# Patient Record
Sex: FEMALE | Race: BLACK OR AFRICAN AMERICAN | Employment: FULL TIME | URBAN - METROPOLITAN AREA
[De-identification: names, ages, dates, MRNs, and addresses within clinical notes are randomized per-mention and may not be internally consistent; named-entity substitution may affect disease eponyms.]

---

## 2022-07-21 ENCOUNTER — OFFICE VISIT (OUTPATIENT)
Dept: OBGYN CLINIC | Facility: CLINIC | Age: 42
End: 2022-07-21
Payer: COMMERCIAL

## 2022-07-21 VITALS
WEIGHT: 170 LBS | DIASTOLIC BLOOD PRESSURE: 81 MMHG | HEART RATE: 73 BPM | SYSTOLIC BLOOD PRESSURE: 118 MMHG | BODY MASS INDEX: 28 KG/M2

## 2022-07-21 DIAGNOSIS — N89.8 VAGINAL DRYNESS: Primary | ICD-10-CM

## 2022-07-21 DIAGNOSIS — N76.0 VAGINITIS AND VULVOVAGINITIS: ICD-10-CM

## 2022-07-21 PROCEDURE — 3074F SYST BP LT 130 MM HG: CPT | Performed by: OBSTETRICS & GYNECOLOGY

## 2022-07-21 PROCEDURE — 3079F DIAST BP 80-89 MM HG: CPT | Performed by: OBSTETRICS & GYNECOLOGY

## 2022-07-21 PROCEDURE — 99203 OFFICE O/P NEW LOW 30 MIN: CPT | Performed by: OBSTETRICS & GYNECOLOGY

## 2022-07-21 RX ORDER — NAPROXEN 500 MG/1
500 TABLET ORAL 2 TIMES DAILY PRN
COMMUNITY
Start: 2022-06-30

## 2022-07-21 RX ORDER — FLUTICASONE FUROATE AND VILANTEROL 100; 25 UG/1; UG/1
1 POWDER RESPIRATORY (INHALATION) DAILY
COMMUNITY
Start: 2022-05-18

## 2022-07-21 RX ORDER — FLUTICASONE FUROATE AND VILANTEROL TRIFENATATE 100; 25 UG/1; UG/1
1 POWDER RESPIRATORY (INHALATION) DAILY
COMMUNITY
Start: 2022-05-18

## 2022-07-21 RX ORDER — MONTELUKAST SODIUM 10 MG/1
1 TABLET ORAL NIGHTLY
COMMUNITY
Start: 2022-06-17

## 2022-07-21 RX ORDER — FLUCONAZOLE 200 MG/1
200 TABLET ORAL
Qty: 3 TABLET | Refills: 1 | Status: SHIPPED | OUTPATIENT
Start: 2022-07-21 | End: 2022-07-28

## 2022-07-21 RX ORDER — ESTRADIOL 0.1 MG/G
1 CREAM VAGINAL
COMMUNITY
Start: 2022-01-25 | End: 2022-07-21

## 2022-07-21 RX ORDER — LINACLOTIDE 145 UG/1
1 CAPSULE, GELATIN COATED ORAL
COMMUNITY
Start: 2021-11-18

## 2022-07-21 RX ORDER — TRIAMCINOLONE ACETONIDE 1 MG/G
CREAM TOPICAL
COMMUNITY
Start: 2022-02-28

## 2022-07-21 RX ORDER — ESTRADIOL 0.1 MG/G
1 CREAM VAGINAL
Qty: 42.5 G | Refills: 3 | Status: SHIPPED | OUTPATIENT
Start: 2022-07-21 | End: 2022-07-21

## 2022-07-22 DIAGNOSIS — B37.9 YEAST INFECTION: ICD-10-CM

## 2022-07-22 DIAGNOSIS — N76.0 BACTERIAL VAGINITIS: Primary | ICD-10-CM

## 2022-07-22 DIAGNOSIS — B96.89 BACTERIAL VAGINITIS: Primary | ICD-10-CM

## 2022-07-22 RX ORDER — METRONIDAZOLE 500 MG/1
500 TABLET ORAL 2 TIMES DAILY
Qty: 14 TABLET | Refills: 0 | Status: SHIPPED | OUTPATIENT
Start: 2022-07-22

## 2022-10-24 ENCOUNTER — OFFICE VISIT (OUTPATIENT)
Dept: OBGYN CLINIC | Facility: CLINIC | Age: 42
End: 2022-10-24
Payer: COMMERCIAL

## 2022-10-24 VITALS — BODY MASS INDEX: 28 KG/M2 | WEIGHT: 170.81 LBS

## 2022-10-24 DIAGNOSIS — N76.0 BACTERIAL VAGINOSIS: ICD-10-CM

## 2022-10-24 DIAGNOSIS — N89.8 VAGINAL DRYNESS: ICD-10-CM

## 2022-10-24 DIAGNOSIS — N89.8 VAGINAL ODOR: Primary | ICD-10-CM

## 2022-10-24 DIAGNOSIS — N89.8 VAGINAL IRRITATION: ICD-10-CM

## 2022-10-24 DIAGNOSIS — N89.8 VAGINAL DISCHARGE: ICD-10-CM

## 2022-10-24 DIAGNOSIS — B96.89 BACTERIAL VAGINOSIS: ICD-10-CM

## 2022-10-24 PROCEDURE — 99213 OFFICE O/P EST LOW 20 MIN: CPT | Performed by: NURSE PRACTITIONER

## 2022-10-24 PROCEDURE — 87220 TISSUE EXAM FOR FUNGI: CPT | Performed by: NURSE PRACTITIONER

## 2022-10-24 RX ORDER — TACROLIMUS 1 MG/G
OINTMENT TOPICAL
COMMUNITY
Start: 2022-10-07

## 2022-10-24 RX ORDER — ESTRADIOL 0.1 MG/G
CREAM VAGINAL
COMMUNITY
Start: 2022-07-21

## 2022-10-24 RX ORDER — METRONIDAZOLE 7.5 MG/G
1 GEL VAGINAL NIGHTLY
Qty: 1 EACH | Refills: 0 | Status: SHIPPED | OUTPATIENT
Start: 2022-10-24 | End: 2022-10-29

## 2022-10-25 LAB
C TRACH DNA SPEC QL NAA+PROBE: NEGATIVE
N GONORRHOEA DNA SPEC QL NAA+PROBE: NEGATIVE

## 2022-10-26 DIAGNOSIS — B37.31 VAGINAL CANDIDIASIS: Primary | ICD-10-CM

## 2022-10-26 LAB
GENITAL VAGINOSIS SCREEN: POSITIVE
TRICHOMONAS SCREEN: NEGATIVE

## 2022-10-26 RX ORDER — FLUCONAZOLE 150 MG/1
150 TABLET ORAL ONCE
Qty: 2 TABLET | Refills: 0 | Status: SHIPPED | OUTPATIENT
Start: 2022-10-26 | End: 2022-10-26

## 2022-11-10 ENCOUNTER — OFFICE VISIT (OUTPATIENT)
Dept: OBGYN CLINIC | Facility: CLINIC | Age: 42
End: 2022-11-10
Payer: COMMERCIAL

## 2022-11-10 VITALS — SYSTOLIC BLOOD PRESSURE: 128 MMHG | WEIGHT: 170 LBS | DIASTOLIC BLOOD PRESSURE: 86 MMHG | BODY MASS INDEX: 28 KG/M2

## 2022-11-10 DIAGNOSIS — N76.0 VAGINITIS AND VULVOVAGINITIS: Primary | ICD-10-CM

## 2022-11-10 PROCEDURE — 99213 OFFICE O/P EST LOW 20 MIN: CPT | Performed by: OBSTETRICS & GYNECOLOGY

## 2022-11-10 PROCEDURE — 3079F DIAST BP 80-89 MM HG: CPT | Performed by: OBSTETRICS & GYNECOLOGY

## 2022-11-10 PROCEDURE — 3074F SYST BP LT 130 MM HG: CPT | Performed by: OBSTETRICS & GYNECOLOGY

## 2022-11-10 RX ORDER — PIMECROLIMUS 10 MG/G
1 CREAM TOPICAL 2 TIMES DAILY
COMMUNITY
Start: 2022-10-31

## 2022-11-10 RX ORDER — CYCLOBENZAPRINE HCL 10 MG
10 TABLET ORAL 3 TIMES DAILY
COMMUNITY
Start: 2022-11-03

## 2022-11-10 RX ORDER — ALCLOMETASONE DIPROPIONATE 0.5 MG/G
OINTMENT TOPICAL
COMMUNITY
Start: 2022-10-24

## 2022-11-12 RX ORDER — METRONIDAZOLE 10 MG/G
1 GEL TOPICAL DAILY
Qty: 22 EACH | Refills: 1 | Status: SHIPPED | OUTPATIENT
Start: 2022-11-12

## 2022-11-15 ENCOUNTER — TELEPHONE (OUTPATIENT)
Dept: OBGYN CLINIC | Facility: CLINIC | Age: 42
End: 2022-11-15

## 2022-11-15 NOTE — TELEPHONE ENCOUNTER
metroNIDAZOLE (METROGEL) 1 % External Gel, Apply 1 Application topically daily. Vaginally at bedtime x 7 nights then weekly for 15 weeks, Disp: 22 each, Rfl: 1    MESSAGE: please clarify is this for topical metrogel 1% or vaginal metrogel 0.75%? Please send new prescription and update directions.

## 2022-11-15 NOTE — TELEPHONE ENCOUNTER
Pt to apply vaginally. This RN informed pharmacy of above and they will update pt's script. No further questions.

## 2023-01-15 ENCOUNTER — HOSPITAL ENCOUNTER (EMERGENCY)
Facility: HOSPITAL | Age: 43
Discharge: HOME | End: 2023-01-15
Attending: EMERGENCY MEDICINE
Payer: COMMERCIAL

## 2023-01-15 VITALS
OXYGEN SATURATION: 93 % | WEIGHT: 169 LBS | RESPIRATION RATE: 16 BRPM | HEIGHT: 63 IN | BODY MASS INDEX: 29.95 KG/M2 | TEMPERATURE: 98 F | SYSTOLIC BLOOD PRESSURE: 119 MMHG | HEART RATE: 74 BPM | DIASTOLIC BLOOD PRESSURE: 83 MMHG

## 2023-01-15 DIAGNOSIS — B37.9 YEAST INFECTION: ICD-10-CM

## 2023-01-15 DIAGNOSIS — N76.0 BACTERIAL VAGINOSIS: Primary | ICD-10-CM

## 2023-01-15 DIAGNOSIS — B96.89 BACTERIAL VAGINOSIS: Primary | ICD-10-CM

## 2023-01-15 LAB
B-HCG UR QL: NEGATIVE
BILIRUB UR QL STRIP.AUTO: NEGATIVE
CLARITY UR REFRACT.AUTO: CLEAR
COLOR UR AUTO: YELLOW
GLUCOSE UR STRIP.AUTO-MCNC: NEGATIVE MG/DL
KETONES UR STRIP.AUTO-MCNC: NEGATIVE MG/DL
NITRITE UR QL STRIP.AUTO: NEGATIVE
PH UR STRIP.AUTO: 7 [PH] (ref 5–8)
PROT UR STRIP.AUTO-MCNC: NEGATIVE MG/DL
RBC UR QL AUTO: NEGATIVE
SP GR UR STRIP.AUTO: 1.02 (ref 1–1.03)
UROBILINOGEN UR STRIP.AUTO-MCNC: 0.2 MG/DL

## 2023-01-15 PROCEDURE — 87086 URINE CULTURE/COLONY COUNT: CPT | Performed by: EMERGENCY MEDICINE

## 2023-01-15 PROCEDURE — 81015 MICROSCOPIC EXAM OF URINE: CPT | Performed by: EMERGENCY MEDICINE

## 2023-01-15 PROCEDURE — 87660 TRICHOMONAS VAGIN DIR PROBE: CPT | Performed by: EMERGENCY MEDICINE

## 2023-01-15 PROCEDURE — 99284 EMERGENCY DEPT VISIT MOD MDM: CPT

## 2023-01-15 PROCEDURE — 87510 GARDNER VAG DNA DIR PROBE: CPT | Performed by: EMERGENCY MEDICINE

## 2023-01-15 PROCEDURE — 81025 URINE PREGNANCY TEST: CPT

## 2023-01-15 PROCEDURE — 87491 CHLMYD TRACH DNA AMP PROBE: CPT | Performed by: EMERGENCY MEDICINE

## 2023-01-15 PROCEDURE — 87591 N.GONORRHOEAE DNA AMP PROB: CPT | Performed by: EMERGENCY MEDICINE

## 2023-01-15 PROCEDURE — 81001 URINALYSIS AUTO W/SCOPE: CPT | Performed by: EMERGENCY MEDICINE

## 2023-01-15 PROCEDURE — 87480 CANDIDA DNA DIR PROBE: CPT | Performed by: EMERGENCY MEDICINE

## 2023-01-15 RX ORDER — METRONIDAZOLE 500 MG/1
500 TABLET ORAL 3 TIMES DAILY
Qty: 30 TABLET | Refills: 0 | Status: SHIPPED | OUTPATIENT
Start: 2023-01-15 | End: 2023-01-25

## 2023-01-15 RX ORDER — FLUCONAZOLE 200 MG/1
200 TABLET ORAL DAILY
Qty: 2 TABLET | Refills: 0 | Status: SHIPPED | OUTPATIENT
Start: 2023-01-15 | End: 2023-01-17

## 2023-01-15 NOTE — ED INITIAL ASSESSMENT (HPI)
Pt c/o \"vaginal irritation\" x 1 week. Pt states recurrent bacterial infections lately. Pt states today having yellow clumpy discharge. Pt also c/o mild cramping.

## 2023-01-16 LAB
C TRACH DNA SPEC QL NAA+PROBE: NEGATIVE
N GONORRHOEA DNA SPEC QL NAA+PROBE: NEGATIVE

## 2023-03-16 ENCOUNTER — LAB ENCOUNTER (OUTPATIENT)
Dept: LAB | Age: 43
End: 2023-03-16
Attending: OBSTETRICS & GYNECOLOGY
Payer: COMMERCIAL

## 2023-03-16 ENCOUNTER — OFFICE VISIT (OUTPATIENT)
Dept: OBGYN CLINIC | Facility: CLINIC | Age: 43
End: 2023-03-16

## 2023-03-16 VITALS
WEIGHT: 170 LBS | DIASTOLIC BLOOD PRESSURE: 81 MMHG | BODY MASS INDEX: 30.12 KG/M2 | SYSTOLIC BLOOD PRESSURE: 127 MMHG | HEIGHT: 63 IN

## 2023-03-16 DIAGNOSIS — Z12.31 ENCOUNTER FOR SCREENING MAMMOGRAM FOR BREAST CANCER: ICD-10-CM

## 2023-03-16 DIAGNOSIS — Z13.220 SCREENING, LIPID: ICD-10-CM

## 2023-03-16 DIAGNOSIS — Z01.419 ENCOUNTER FOR WELL WOMAN EXAM WITH ROUTINE GYNECOLOGICAL EXAM: Primary | ICD-10-CM

## 2023-03-16 DIAGNOSIS — Z13.29 THYROID DISORDER SCREENING: ICD-10-CM

## 2023-03-16 DIAGNOSIS — N76.0 ACUTE VAGINITIS: ICD-10-CM

## 2023-03-16 DIAGNOSIS — Z13.1 SCREENING FOR DIABETES MELLITUS (DM): ICD-10-CM

## 2023-03-16 LAB
CHOLEST SERPL-MCNC: 169 MG/DL (ref ?–200)
FASTING PATIENT GLUCOSE ANSWER: NO
FASTING PATIENT LIPID ANSWER: NO
GLUCOSE BLD-MCNC: 83 MG/DL (ref 70–99)
HDLC SERPL-MCNC: 60 MG/DL (ref 40–59)
LDLC SERPL CALC-MCNC: 95 MG/DL (ref ?–100)
NONHDLC SERPL-MCNC: 109 MG/DL (ref ?–130)
TRIGL SERPL-MCNC: 76 MG/DL (ref 30–149)
TSI SER-ACNC: 2.74 MIU/ML (ref 0.36–3.74)
VLDLC SERPL CALC-MCNC: 12 MG/DL (ref 0–30)

## 2023-03-16 PROCEDURE — 3079F DIAST BP 80-89 MM HG: CPT | Performed by: OBSTETRICS & GYNECOLOGY

## 2023-03-16 PROCEDURE — 80061 LIPID PANEL: CPT

## 2023-03-16 PROCEDURE — 99396 PREV VISIT EST AGE 40-64: CPT | Performed by: OBSTETRICS & GYNECOLOGY

## 2023-03-16 PROCEDURE — 84443 ASSAY THYROID STIM HORMONE: CPT

## 2023-03-16 PROCEDURE — 3074F SYST BP LT 130 MM HG: CPT | Performed by: OBSTETRICS & GYNECOLOGY

## 2023-03-16 PROCEDURE — 82947 ASSAY GLUCOSE BLOOD QUANT: CPT

## 2023-03-16 PROCEDURE — 3008F BODY MASS INDEX DOCD: CPT | Performed by: OBSTETRICS & GYNECOLOGY

## 2023-03-17 ENCOUNTER — TELEPHONE (OUTPATIENT)
Dept: OBGYN CLINIC | Facility: CLINIC | Age: 43
End: 2023-03-17

## 2023-03-17 NOTE — TELEPHONE ENCOUNTER
Pt has test results and asking if a prescription will be sent to pharmacy.     Confirmed pharmacy  Pls advise

## 2023-03-18 RX ORDER — FLUCONAZOLE 200 MG/1
200 TABLET ORAL
Qty: 3 TABLET | Refills: 1 | Status: SHIPPED | OUTPATIENT
Start: 2023-03-18 | End: 2023-03-25

## 2023-03-18 RX ORDER — METRONIDAZOLE 7.5 MG/G
1 GEL VAGINAL NIGHTLY
Qty: 20 EACH | Refills: 1 | Status: SHIPPED | OUTPATIENT
Start: 2023-03-18 | End: 2023-03-23

## 2023-03-20 NOTE — TELEPHONE ENCOUNTER
Meg - you have yeast and BV      Need to start probiotics for the vagina - see [facility] for options or [facility]otics via [address] and meds called into your pharmacy. Take as directed.       Peng Julio MD   Written by Peng Julio MD on 3/18/2023 10:51 AM CDT  Seen by patient Kristin Case 238 on 3/18/2023 11:37 AM

## 2023-03-24 ENCOUNTER — HOSPITAL ENCOUNTER (OUTPATIENT)
Dept: MAMMOGRAPHY | Age: 43
Discharge: HOME | End: 2023-03-24
Attending: OBSTETRICS & GYNECOLOGY
Payer: COMMERCIAL

## 2023-03-24 DIAGNOSIS — Z12.31 ENCOUNTER FOR SCREENING MAMMOGRAM FOR BREAST CANCER: ICD-10-CM

## 2023-03-24 PROCEDURE — 77067 SCR MAMMO BI INCL CAD: CPT | Performed by: OBSTETRICS & GYNECOLOGY

## 2023-03-24 PROCEDURE — 77063 BREAST TOMOSYNTHESIS BI: CPT | Performed by: OBSTETRICS & GYNECOLOGY

## 2023-06-07 ENCOUNTER — HOSPITAL ENCOUNTER (EMERGENCY)
Facility: HOSPITAL | Age: 43
Discharge: HOME | End: 2023-06-08
Attending: EMERGENCY MEDICINE
Payer: COMMERCIAL

## 2023-06-07 DIAGNOSIS — N83.201 CYST OF RIGHT OVARY: Primary | ICD-10-CM

## 2023-06-07 LAB
B-HCG UR QL: NEGATIVE
BILIRUB UR QL STRIP.AUTO: NEGATIVE
COLOR UR AUTO: YELLOW
GLUCOSE UR STRIP.AUTO-MCNC: NEGATIVE MG/DL
KETONES UR STRIP.AUTO-MCNC: NEGATIVE MG/DL
NITRITE UR QL STRIP.AUTO: POSITIVE
PH UR STRIP.AUTO: 5 [PH] (ref 5–8)
PROT UR STRIP.AUTO-MCNC: NEGATIVE MG/DL
SP GR UR STRIP.AUTO: 1.01 (ref 1–1.03)
UROBILINOGEN UR STRIP.AUTO-MCNC: <2 MG/DL

## 2023-06-07 PROCEDURE — 99284 EMERGENCY DEPT VISIT MOD MDM: CPT

## 2023-06-07 PROCEDURE — 87077 CULTURE AEROBIC IDENTIFY: CPT | Performed by: EMERGENCY MEDICINE

## 2023-06-07 PROCEDURE — 81001 URINALYSIS AUTO W/SCOPE: CPT | Performed by: EMERGENCY MEDICINE

## 2023-06-07 PROCEDURE — 87086 URINE CULTURE/COLONY COUNT: CPT | Performed by: EMERGENCY MEDICINE

## 2023-06-07 PROCEDURE — 99285 EMERGENCY DEPT VISIT HI MDM: CPT

## 2023-06-07 PROCEDURE — 81001 URINALYSIS AUTO W/SCOPE: CPT

## 2023-06-07 PROCEDURE — 81025 URINE PREGNANCY TEST: CPT

## 2023-06-07 RX ORDER — METHYLPREDNISOLONE SODIUM SUCCINATE 125 MG/2ML
125 INJECTION, POWDER, LYOPHILIZED, FOR SOLUTION INTRAMUSCULAR; INTRAVENOUS ONCE
Status: COMPLETED | OUTPATIENT
Start: 2023-06-07 | End: 2023-06-08

## 2023-06-07 RX ORDER — DIPHENHYDRAMINE HYDROCHLORIDE 50 MG/ML
25 INJECTION INTRAMUSCULAR; INTRAVENOUS ONCE
Status: COMPLETED | OUTPATIENT
Start: 2023-06-07 | End: 2023-06-08

## 2023-06-08 ENCOUNTER — APPOINTMENT (OUTPATIENT)
Dept: CT IMAGING | Facility: HOSPITAL | Age: 43
End: 2023-06-08
Attending: EMERGENCY MEDICINE
Payer: COMMERCIAL

## 2023-06-08 VITALS
SYSTOLIC BLOOD PRESSURE: 102 MMHG | WEIGHT: 170 LBS | TEMPERATURE: 98 F | RESPIRATION RATE: 15 BRPM | HEIGHT: 63 IN | HEART RATE: 68 BPM | OXYGEN SATURATION: 98 % | BODY MASS INDEX: 30.12 KG/M2 | DIASTOLIC BLOOD PRESSURE: 56 MMHG

## 2023-06-08 LAB
ALBUMIN SERPL-MCNC: 3.5 G/DL (ref 3.4–5)
ALBUMIN/GLOB SERPL: 1 {RATIO} (ref 1–2)
ALP LIVER SERPL-CCNC: 49 U/L
ALT SERPL-CCNC: 11 U/L
ANION GAP SERPL CALC-SCNC: 6 MMOL/L (ref 0–18)
AST SERPL-CCNC: 12 U/L (ref 15–37)
BASOPHILS # BLD AUTO: 0.03 X10(3) UL (ref 0–0.2)
BASOPHILS NFR BLD AUTO: 0.3 %
BILIRUB SERPL-MCNC: 0.4 MG/DL (ref 0.1–2)
BUN BLD-MCNC: 13 MG/DL (ref 7–18)
CALCIUM BLD-MCNC: 9.2 MG/DL (ref 8.5–10.1)
CHLORIDE SERPL-SCNC: 107 MMOL/L (ref 98–112)
CO2 SERPL-SCNC: 25 MMOL/L (ref 21–32)
CREAT BLD-MCNC: 0.62 MG/DL
EOSINOPHIL # BLD AUTO: 0.13 X10(3) UL (ref 0–0.7)
EOSINOPHIL NFR BLD AUTO: 1.5 %
ERYTHROCYTE [DISTWIDTH] IN BLOOD BY AUTOMATED COUNT: 12.9 %
GFR SERPLBLD BASED ON 1.73 SQ M-ARVRAT: 113 ML/MIN/1.73M2 (ref 60–?)
GLOBULIN PLAS-MCNC: 3.6 G/DL (ref 2.8–4.4)
GLUCOSE BLD-MCNC: 89 MG/DL (ref 70–99)
HCT VFR BLD AUTO: 33.3 %
HGB BLD-MCNC: 12.1 G/DL
IMM GRANULOCYTES # BLD AUTO: 0.02 X10(3) UL (ref 0–1)
IMM GRANULOCYTES NFR BLD: 0.2 %
LIPASE SERPL-CCNC: 66 U/L (ref 13–75)
LYMPHOCYTES # BLD AUTO: 3.33 X10(3) UL (ref 1–4)
LYMPHOCYTES NFR BLD AUTO: 37.6 %
MCH RBC QN AUTO: 29.6 PG (ref 26–34)
MCHC RBC AUTO-ENTMCNC: 36.3 G/DL (ref 31–37)
MCV RBC AUTO: 81.4 FL
MONOCYTES # BLD AUTO: 0.79 X10(3) UL (ref 0.1–1)
MONOCYTES NFR BLD AUTO: 8.9 %
NEUTROPHILS # BLD AUTO: 4.55 X10 (3) UL (ref 1.5–7.7)
NEUTROPHILS # BLD AUTO: 4.55 X10(3) UL (ref 1.5–7.7)
NEUTROPHILS NFR BLD AUTO: 51.5 %
OSMOLALITY SERPL CALC.SUM OF ELEC: 286 MOSM/KG (ref 275–295)
PLATELET # BLD AUTO: 221 10(3)UL (ref 150–450)
POTASSIUM SERPL-SCNC: 3.5 MMOL/L (ref 3.5–5.1)
PROT SERPL-MCNC: 7.1 G/DL (ref 6.4–8.2)
RBC # BLD AUTO: 4.09 X10(6)UL
SODIUM SERPL-SCNC: 138 MMOL/L (ref 136–145)
WBC # BLD AUTO: 8.9 X10(3) UL (ref 4–11)

## 2023-06-08 PROCEDURE — 87510 GARDNER VAG DNA DIR PROBE: CPT | Performed by: EMERGENCY MEDICINE

## 2023-06-08 PROCEDURE — 87491 CHLMYD TRACH DNA AMP PROBE: CPT | Performed by: EMERGENCY MEDICINE

## 2023-06-08 PROCEDURE — 96372 THER/PROPH/DIAG INJ SC/IM: CPT

## 2023-06-08 PROCEDURE — 96374 THER/PROPH/DIAG INJ IV PUSH: CPT

## 2023-06-08 PROCEDURE — 74177 CT ABD & PELVIS W/CONTRAST: CPT | Performed by: EMERGENCY MEDICINE

## 2023-06-08 PROCEDURE — 96375 TX/PRO/DX INJ NEW DRUG ADDON: CPT

## 2023-06-08 PROCEDURE — 87660 TRICHOMONAS VAGIN DIR PROBE: CPT | Performed by: EMERGENCY MEDICINE

## 2023-06-08 PROCEDURE — 85025 COMPLETE CBC W/AUTO DIFF WBC: CPT | Performed by: EMERGENCY MEDICINE

## 2023-06-08 PROCEDURE — 87480 CANDIDA DNA DIR PROBE: CPT | Performed by: EMERGENCY MEDICINE

## 2023-06-08 PROCEDURE — 87591 N.GONORRHOEAE DNA AMP PROB: CPT | Performed by: EMERGENCY MEDICINE

## 2023-06-08 PROCEDURE — 80053 COMPREHEN METABOLIC PANEL: CPT | Performed by: EMERGENCY MEDICINE

## 2023-06-08 PROCEDURE — 83690 ASSAY OF LIPASE: CPT | Performed by: EMERGENCY MEDICINE

## 2023-06-08 RX ORDER — METRONIDAZOLE 500 MG/1
2000 TABLET ORAL ONCE
Qty: 4 TABLET | Refills: 0 | Status: SHIPPED | OUTPATIENT
Start: 2023-06-08 | End: 2023-06-08

## 2023-06-08 RX ORDER — DOXYCYCLINE HYCLATE 100 MG/1
100 CAPSULE ORAL ONCE
Status: COMPLETED | OUTPATIENT
Start: 2023-06-08 | End: 2023-06-08

## 2023-06-08 RX ORDER — DOXYCYCLINE HYCLATE 100 MG/1
100 CAPSULE ORAL 2 TIMES DAILY
Qty: 14 CAPSULE | Refills: 0 | Status: SHIPPED | OUTPATIENT
Start: 2023-06-08 | End: 2023-06-15

## 2023-06-08 NOTE — ED INITIAL ASSESSMENT (HPI)
Pt c/o mid/lower abdominal cramping consistently since Saturday. Hx partial hysterectomy, has noticed \"moderate discharge, no odor or bleeding\". Pt states she notices \"an odd odor\" when urinating.

## 2023-06-09 LAB
C TRACH DNA SPEC QL NAA+PROBE: NEGATIVE
N GONORRHOEA DNA SPEC QL NAA+PROBE: NEGATIVE

## 2023-06-10 ENCOUNTER — PATIENT MESSAGE (OUTPATIENT)
Dept: OBGYN CLINIC | Facility: CLINIC | Age: 43
End: 2023-06-10

## 2023-06-12 RX ORDER — FLUCONAZOLE 150 MG/1
150 TABLET ORAL ONCE
Qty: 1 TABLET | Refills: 0 | Status: SHIPPED | OUTPATIENT
Start: 2023-06-12 | End: 2023-06-12

## 2023-06-20 ENCOUNTER — OFFICE VISIT (OUTPATIENT)
Dept: OBGYN CLINIC | Facility: CLINIC | Age: 43
End: 2023-06-20

## 2023-06-20 VITALS
HEIGHT: 63 IN | SYSTOLIC BLOOD PRESSURE: 140 MMHG | DIASTOLIC BLOOD PRESSURE: 85 MMHG | WEIGHT: 168.5 LBS | HEART RATE: 76 BPM | BODY MASS INDEX: 29.86 KG/M2

## 2023-06-20 DIAGNOSIS — N95.1 PERIMENOPAUSE: ICD-10-CM

## 2023-06-20 DIAGNOSIS — N89.8 VAGINAL DISCHARGE: ICD-10-CM

## 2023-06-20 DIAGNOSIS — N30.01 ACUTE CYSTITIS WITH HEMATURIA: Primary | ICD-10-CM

## 2023-06-20 RX ORDER — SULFAMETHOXAZOLE AND TRIMETHOPRIM 800; 160 MG/1; MG/1
1 TABLET ORAL 2 TIMES DAILY
Qty: 10 TABLET | Refills: 0 | Status: SHIPPED | OUTPATIENT
Start: 2023-06-20 | End: 2023-06-25

## 2023-06-20 RX ORDER — LEVONORGESTREL AND ETHINYL ESTRADIOL 0.1-0.02MG
1 KIT ORAL DAILY
Qty: 84 EACH | Refills: 0 | COMMUNITY
Start: 2023-06-20 | End: 2024-06-19

## 2023-06-30 ENCOUNTER — TELEPHONE (OUTPATIENT)
Dept: OBGYN CLINIC | Facility: CLINIC | Age: 43
End: 2023-06-30

## 2023-06-30 RX ORDER — CLINDAMYCIN HYDROCHLORIDE 300 MG/1
300 CAPSULE ORAL 2 TIMES DAILY
Qty: 14 CAPSULE | Refills: 0 | Status: SHIPPED | OUTPATIENT
Start: 2023-06-30

## 2023-06-30 RX ORDER — FLUCONAZOLE 150 MG/1
150 TABLET ORAL ONCE
Qty: 1 TABLET | Refills: 1 | Status: SHIPPED | OUTPATIENT
Start: 2023-06-30 | End: 2023-06-30

## 2023-07-12 ENCOUNTER — PATIENT MESSAGE (OUTPATIENT)
Dept: OBGYN CLINIC | Facility: CLINIC | Age: 43
End: 2023-07-12

## 2023-07-12 RX ORDER — FLUCONAZOLE 150 MG/1
150 TABLET ORAL ONCE
Qty: 1 TABLET | Refills: 0 | Status: SHIPPED | OUTPATIENT
Start: 2023-07-12 | End: 2023-07-12

## 2023-09-02 ENCOUNTER — HOSPITAL ENCOUNTER (OUTPATIENT)
Age: 43
Discharge: HOME | End: 2023-09-02
Payer: COMMERCIAL

## 2023-09-02 VITALS
SYSTOLIC BLOOD PRESSURE: 103 MMHG | BODY MASS INDEX: 30.39 KG/M2 | WEIGHT: 178 LBS | HEIGHT: 64 IN | RESPIRATION RATE: 18 BRPM | HEART RATE: 94 BPM | DIASTOLIC BLOOD PRESSURE: 81 MMHG | OXYGEN SATURATION: 96 %

## 2023-09-02 DIAGNOSIS — N89.8 VAGINAL DISCHARGE: Primary | ICD-10-CM

## 2023-09-02 LAB
BILIRUB UR QL STRIP: NEGATIVE
GLUCOSE UR STRIP-MCNC: NEGATIVE MG/DL
HGB UR QL STRIP: NEGATIVE
KETONES UR STRIP-MCNC: NEGATIVE MG/DL
NITRITE UR QL STRIP: NEGATIVE
PH UR STRIP: 5.5 [PH]
PROT UR STRIP-MCNC: NEGATIVE MG/DL
SP GR UR STRIP: 1.02
UROBILINOGEN UR STRIP-ACNC: <2 MG/DL

## 2023-09-02 PROCEDURE — 87491 CHLMYD TRACH DNA AMP PROBE: CPT | Performed by: PHYSICIAN ASSISTANT

## 2023-09-02 PROCEDURE — 87480 CANDIDA DNA DIR PROBE: CPT | Performed by: PHYSICIAN ASSISTANT

## 2023-09-02 PROCEDURE — 87086 URINE CULTURE/COLONY COUNT: CPT | Performed by: PHYSICIAN ASSISTANT

## 2023-09-02 PROCEDURE — 87591 N.GONORRHOEAE DNA AMP PROB: CPT | Performed by: PHYSICIAN ASSISTANT

## 2023-09-02 PROCEDURE — 87510 GARDNER VAG DNA DIR PROBE: CPT | Performed by: PHYSICIAN ASSISTANT

## 2023-09-02 PROCEDURE — 87660 TRICHOMONAS VAGIN DIR PROBE: CPT | Performed by: PHYSICIAN ASSISTANT

## 2023-09-02 RX ORDER — METRONIDAZOLE 500 MG/1
500 TABLET ORAL 3 TIMES DAILY
Qty: 21 TABLET | Refills: 0 | Status: SHIPPED | OUTPATIENT
Start: 2023-09-02 | End: 2023-09-02

## 2023-09-02 RX ORDER — METRONIDAZOLE 500 MG/1
500 TABLET ORAL 3 TIMES DAILY
Qty: 21 TABLET | Refills: 0 | Status: SHIPPED | OUTPATIENT
Start: 2023-09-02 | End: 2023-09-09

## 2023-09-04 RX ORDER — FLUCONAZOLE 150 MG/1
150 TABLET ORAL ONCE
Qty: 1 TABLET | Refills: 0 | Status: SHIPPED | OUTPATIENT
Start: 2023-09-04 | End: 2023-09-04

## 2023-09-05 LAB
C TRACH DNA SPEC QL NAA+PROBE: NEGATIVE
N GONORRHOEA DNA SPEC QL NAA+PROBE: NEGATIVE

## 2023-10-30 ENCOUNTER — OFFICE VISIT (OUTPATIENT)
Dept: OBGYN CLINIC | Facility: CLINIC | Age: 43
End: 2023-10-30

## 2023-10-30 ENCOUNTER — TELEPHONE (OUTPATIENT)
Dept: OBGYN CLINIC | Facility: CLINIC | Age: 43
End: 2023-10-30

## 2023-10-30 VITALS
WEIGHT: 170.19 LBS | BODY MASS INDEX: 29.06 KG/M2 | HEIGHT: 64 IN | SYSTOLIC BLOOD PRESSURE: 114 MMHG | DIASTOLIC BLOOD PRESSURE: 76 MMHG

## 2023-10-30 DIAGNOSIS — Z13.1 SCREENING FOR DIABETES MELLITUS (DM): Primary | ICD-10-CM

## 2023-10-30 DIAGNOSIS — N76.0 VAGINITIS AND VULVOVAGINITIS: ICD-10-CM

## 2023-10-30 PROCEDURE — 87510 GARDNER VAG DNA DIR PROBE: CPT | Performed by: OBSTETRICS & GYNECOLOGY

## 2023-10-30 PROCEDURE — 87660 TRICHOMONAS VAGIN DIR PROBE: CPT | Performed by: OBSTETRICS & GYNECOLOGY

## 2023-10-30 PROCEDURE — 87480 CANDIDA DNA DIR PROBE: CPT | Performed by: OBSTETRICS & GYNECOLOGY

## 2023-10-30 RX ORDER — ETODOLAC 400 MG/1
400 TABLET, FILM COATED ORAL 2 TIMES DAILY
COMMUNITY
Start: 2022-11-11

## 2023-10-30 RX ORDER — FLUCONAZOLE 200 MG/1
200 TABLET ORAL
Qty: 3 TABLET | Refills: 0 | Status: SHIPPED | OUTPATIENT
Start: 2023-10-30 | End: 2023-11-08

## 2023-10-30 RX ORDER — POLYETHYLENE GLYCOL-3350 AND ELECTROLYTES WITH FLAVOR PACK 240; 5.84; 2.98; 6.72; 22.72 G/278.26G; G/278.26G; G/278.26G; G/278.26G; G/278.26G
POWDER, FOR SOLUTION ORAL
COMMUNITY
Start: 2023-09-07

## 2023-10-30 NOTE — PROGRESS NOTES
Patient presents with:  Vaginal Problem        Meg N Tucker Harding is a 39year old female who presents for vaginal problems. LMP: Hysterectomy. Menses regular: n/a. Menstrual flow normal: n/a. Birth control: 0. Refill 0  Last Pap Smear: 2022 . Any history of abnormal paps: No hx abn   Gardasil:(age 10-35 y/o) n/a. Problems/concerns: yellow  vaginal discharge. Next Appt: n/a        Immunization History   Administered Date(s) Administered    FLUZONE 6 months and older PFS 0.5 ml (63966) 09/28/2017, 12/03/2018, 01/07/2021, 10/31/2022    TDAP 12/03/2018         Current Outpatient Medications:     Etodolac 400 MG Oral Tab, Take 1 tablet (400 mg total) by mouth 2 (two) times daily. , Disp: , Rfl:     GAVILYTE-C 240 g Oral Recon Soln, , Disp: , Rfl:     fluconazole (DIFLUCAN) 200 MG Oral Tab, Take 1 tablet (200 mg total) by mouth every third day for 9 days. , Disp: 3 tablet, Rfl: 0    linaCLOtide (LINZESS) 72 MCG Oral Cap, Take 72 mcg by mouth daily. , Disp: 30 capsule, Rfl: 5    clindamycin 300 MG Oral Cap, Take 1 capsule (300 mg total) by mouth 2 (two) times daily. , Disp: 14 capsule, Rfl: 0    Levonorgest-Eth Estrad-Fe Bisg (BALCOLTRA) 0.1-20 MG-MCG(21) Oral Tab, Take 1 tablet by mouth daily. , Disp: 84 each, Rfl: 0    metroNIDAZOLE (METROGEL) 1 % External Gel, Apply 1 Application topically daily. Vaginally at bedtime x 7 nights then weekly for 15 weeks, Disp: 22 each, Rfl: 1    Alclometasone Dipropionate 0.05 % External Ointment, APPLY TOPICALLY DAILY AS DIRECTED TO FACE, Disp: , Rfl:     tacrolimus 0.1 % External Ointment, , Disp: , Rfl:     BREO ELLIPTA 100-25 MCG/INH Inhalation Aerosol Powder, Breath Activated, Inhale 1 puff into the lungs daily. , Disp: , Rfl:     montelukast 10 MG Oral Tab, Take 1 tablet (10 mg total) by mouth nightly., Disp: , Rfl:     Albuterol Sulfate HFA (VENTOLIN) 108 (90 BASE) MCG/ACT Inhalation Aero Soln, Inhale into the lungs every 6 (six) hours as needed for Wheezing., Disp: , Rfl:       Iodine (Topical)        HIVES  Percocet [Oxycodone*      Radiology Contrast *        OB History     T0    L1    SAB0  IAB0  Ectopic0  Multiple0  Live Births0     Name of Baby 1: Not recorded    Date: [birth year]         GA: Not recorded     Delivery: , Unspecified    Apgar1: Not recorded     Apgar5: Not recorded    Living: Not recorded      Past Medical History:   Diagnosis Date    Abdominal pain     Asthma     Bloating     Change in hair     Constipation     Decorative tattoo     Extrinsic asthma, unspecified     Flatulence/gas pain/belching     Frequent use of laxatives     Irregular bowel habits     Night sweats     Pain in joints     Stress     Wears glasses     Wheezing        Past Surgical History:   Procedure Laterality Date    COLONOSCOPY      HYSTERECTOMY      SUPRACERV ABD HYSTERECTOMY         No family history on file.      Tobacco  Soc Hx        Social History    Socioeconomic History      Marital status:       Spouse name: Not on file      Number of children: Not on file      Years of education: Not on file      Highest education level: Not on file    Occupational History      Not on file    Tobacco Use      Smoking status: Never      Smokeless tobacco: Never    Vaping Use      Vaping Use: Never used    Substance and Sexual Activity      Alcohol use: Yes        Comment: \"occasionally\"      Drug use: Never      Sexual activity: Yes        Partners: Male    Other Topics      Concerns:        Not on file    Social History Narrative      Not on file    Social Determinants of Health  Financial Resource Strain: Not on file  Food Insecurity: Not on file  Transportation Needs: Not on file  Physical Activity: Not on file  Stress: Not on file  Social Connections: Not on file  Housing Stability: Not on file    /76   Ht 5' 4\" (1.626 m)   Wt 170 lb 3.1 oz (77.2 kg)     Wt Readings from Last 3 Encounters:  10/30/23 : 170 lb 3.1 oz (77.2 kg)  23 : 164 lb (74.4 kg)  09/02/23 : 178 lb (80.7 kg)      COVID-19 Vaccine(1) Never done  Pneumococcal Vaccine: Birth to 64yrs(1 - PCV) Never done  Pap Smear Never done  Influenza Vaccine(1) due on 10/01/2023  Annual Physical due on 03/16/2024  Mammogram due on 03/24/2024  DTaP,Tdap,and Td Vaccines(2 - Td or Tdap) due on 12/03/2028  Annual Depression Screening Completed      Review of Systems   General: Present- Feeling well. Not Present- Fever. Female Genitourinary: Not Present- Dysmenorrhea, Dyspareunia, Flank Pain, Frequency, Menstrual Irregularities, Pelvic Pain, Urgency, Urinary Complaints, Vaginal Bleeding and Vaginal dryness. Pain: Present- Pain Rating - 0 on a 0-10 scale. All other systems negative       Physical Exam   The physical exam findings are as follows: Abdomen   Inspection: - Inspection Normal.  Palpation/Percussion: Palpation and Percussion of the abdomen reveal - Non Tender, No hepatosplenomegaly and No Palpable abdominal masses. Female Genitourinary     External Genitalia   Perineum - Normal. Bartholin's Gland - Bilateral - Normal. Clitoris - Normal.  Introitus: Characteristics - Normal. Discharge - None. Labia Majora: Lesions - Bilateral - None. Characteristics - Bilateral - Normal.  Labia Minora: Lesions - Bilateral - None. Characteristics - Bilateral - Normal.  Urethra: Characteristics - Normal. Discharge - None. [place] Gland - Bilateral - Normal.  Vulva: Characteristics - Normal. Lesions - None. Speculum & Bimanual   Vagina:   Vaginal Wall: - Normal.  Vaginal Lesions - None. Vaginal Mucosa - Normal.  Cervix: Characteristics - No Motion tenderness. Discharge - None. Uterus: Characteristics - Non Tender. Position - Midposition. Adnexa: Characteristics - Bilateral - Tender. Masses - No Adnexal Masses. Bladder - Normal.  [place]et [place]: pH - <4.5. Vaginal discharge - White and [employer]. Amine Odor - Absent. Main patient complaints - Discharge and Itching.  Microscopy - Budding yeast and Hyphae, pseudohyphae (with KOH prep).       Rectal   Anorectal Exam: External - normal external exam.    Lymphatic  General Lymphatics   Description - Normal .    1. Screening for diabetes mellitus (DM)    2. Vaginitis and vulvovaginitis

## 2023-10-30 NOTE — TELEPHONE ENCOUNTER
Patient verified name and     Patient has been having recurrent BV infection, patient took Flagyl and Metrogel and has not cleared up. Patient is still having thick yellow discharge. Offered patient appt for today. Agreed. Aware of scheduling details.

## 2023-10-31 RX ORDER — METRONIDAZOLE 7.5 MG/G
1 GEL VAGINAL NIGHTLY
Qty: 70 G | Refills: 0 | Status: SHIPPED
Start: 2023-10-31 | End: 2023-11-07

## 2023-11-03 PROBLEM — K59.00 CONSTIPATION: Status: ACTIVE | Noted: 2023-11-03

## 2023-11-03 PROBLEM — R14.1 ABDOMINAL GAS PAIN: Status: ACTIVE | Noted: 2023-11-03

## 2023-11-03 PROBLEM — R14.0 ABDOMINAL DISTENSION, GASEOUS: Status: ACTIVE | Noted: 2023-11-03

## 2023-11-03 PROBLEM — R10.13 ABDOMINAL PAIN, EPIGASTRIC: Status: ACTIVE | Noted: 2023-11-03

## 2023-11-07 ENCOUNTER — HOSPITAL ENCOUNTER (EMERGENCY)
Facility: HOSPITAL | Age: 43
Discharge: HOME | End: 2023-11-07
Attending: EMERGENCY MEDICINE
Payer: COMMERCIAL

## 2023-11-07 ENCOUNTER — APPOINTMENT (OUTPATIENT)
Dept: GENERAL RADIOLOGY | Facility: HOSPITAL | Age: 43
End: 2023-11-07
Attending: EMERGENCY MEDICINE
Payer: COMMERCIAL

## 2023-11-07 VITALS
SYSTOLIC BLOOD PRESSURE: 95 MMHG | OXYGEN SATURATION: 100 % | HEIGHT: 64 IN | DIASTOLIC BLOOD PRESSURE: 68 MMHG | RESPIRATION RATE: 11 BRPM | WEIGHT: 166 LBS | TEMPERATURE: 99 F | BODY MASS INDEX: 28.34 KG/M2 | HEART RATE: 75 BPM

## 2023-11-07 DIAGNOSIS — R07.89 CHEST PAIN, ATYPICAL: Primary | ICD-10-CM

## 2023-11-07 LAB
ALBUMIN SERPL-MCNC: 3.5 G/DL (ref 3.4–5)
ALBUMIN/GLOB SERPL: 0.9 {RATIO} (ref 1–2)
ALP LIVER SERPL-CCNC: 53 U/L
ALT SERPL-CCNC: 14 U/L
ANION GAP SERPL CALC-SCNC: 8 MMOL/L (ref 0–18)
AST SERPL-CCNC: 12 U/L (ref 15–37)
BASOPHILS # BLD AUTO: 0.02 X10(3) UL (ref 0–0.2)
BASOPHILS NFR BLD AUTO: 0.3 %
BILIRUB SERPL-MCNC: 0.3 MG/DL (ref 0.1–2)
BUN BLD-MCNC: 8 MG/DL (ref 9–23)
CALCIUM BLD-MCNC: 8.8 MG/DL (ref 8.5–10.1)
CHLORIDE SERPL-SCNC: 105 MMOL/L (ref 98–112)
CO2 SERPL-SCNC: 27 MMOL/L (ref 21–32)
CREAT BLD-MCNC: 0.69 MG/DL
EGFRCR SERPLBLD CKD-EPI 2021: 110 ML/MIN/1.73M2 (ref 60–?)
EOSINOPHIL # BLD AUTO: 0.07 X10(3) UL (ref 0–0.7)
EOSINOPHIL NFR BLD AUTO: 0.9 %
ERYTHROCYTE [DISTWIDTH] IN BLOOD BY AUTOMATED COUNT: 12.9 %
GLOBULIN PLAS-MCNC: 3.7 G/DL (ref 2.8–4.4)
GLUCOSE BLD-MCNC: 131 MG/DL (ref 70–99)
HCT VFR BLD AUTO: 34 %
HGB BLD-MCNC: 12.4 G/DL
IMM GRANULOCYTES # BLD AUTO: 0.02 X10(3) UL (ref 0–1)
IMM GRANULOCYTES NFR BLD: 0.3 %
LYMPHOCYTES # BLD AUTO: 3 X10(3) UL (ref 1–4)
LYMPHOCYTES NFR BLD AUTO: 39.6 %
MCH RBC QN AUTO: 30 PG (ref 26–34)
MCHC RBC AUTO-ENTMCNC: 36.5 G/DL (ref 31–37)
MCV RBC AUTO: 82.1 FL
MONOCYTES # BLD AUTO: 0.46 X10(3) UL (ref 0.1–1)
MONOCYTES NFR BLD AUTO: 6.1 %
NEUTROPHILS # BLD AUTO: 4.01 X10 (3) UL (ref 1.5–7.7)
NEUTROPHILS # BLD AUTO: 4.01 X10(3) UL (ref 1.5–7.7)
NEUTROPHILS NFR BLD AUTO: 52.8 %
OSMOLALITY SERPL CALC.SUM OF ELEC: 290 MOSM/KG (ref 275–295)
PLATELET # BLD AUTO: 220 10(3)UL (ref 150–450)
POTASSIUM SERPL-SCNC: 3.4 MMOL/L (ref 3.5–5.1)
PROT SERPL-MCNC: 7.2 G/DL (ref 6.4–8.2)
RBC # BLD AUTO: 4.14 X10(6)UL
SODIUM SERPL-SCNC: 140 MMOL/L (ref 136–145)
TROPONIN I SERPL HS-MCNC: 4 NG/L
WBC # BLD AUTO: 7.6 X10(3) UL (ref 4–11)

## 2023-11-07 PROCEDURE — 84484 ASSAY OF TROPONIN QUANT: CPT | Performed by: EMERGENCY MEDICINE

## 2023-11-07 PROCEDURE — 85025 COMPLETE CBC W/AUTO DIFF WBC: CPT | Performed by: EMERGENCY MEDICINE

## 2023-11-07 PROCEDURE — 93010 ELECTROCARDIOGRAM REPORT: CPT

## 2023-11-07 PROCEDURE — 36415 COLL VENOUS BLD VENIPUNCTURE: CPT

## 2023-11-07 PROCEDURE — 93005 ELECTROCARDIOGRAM TRACING: CPT

## 2023-11-07 PROCEDURE — 80053 COMPREHEN METABOLIC PANEL: CPT | Performed by: EMERGENCY MEDICINE

## 2023-11-07 PROCEDURE — 99284 EMERGENCY DEPT VISIT MOD MDM: CPT

## 2023-11-07 PROCEDURE — 71045 X-RAY EXAM CHEST 1 VIEW: CPT | Performed by: EMERGENCY MEDICINE

## 2023-11-07 PROCEDURE — 99285 EMERGENCY DEPT VISIT HI MDM: CPT

## 2023-11-07 RX ORDER — PANTOPRAZOLE SODIUM 40 MG/1
40 TABLET, DELAYED RELEASE ORAL DAILY
Qty: 30 TABLET | Refills: 0 | Status: SHIPPED | OUTPATIENT
Start: 2023-11-07 | End: 2023-12-07

## 2023-11-07 NOTE — ED INITIAL ASSESSMENT (HPI)
Patient to ER w/ complaints of chest tightness. Symptoms began Friday after she had a scope. Today pain is more persistent.

## 2023-11-08 LAB
ATRIAL RATE: 71 BPM
P AXIS: 64 DEGREES
P-R INTERVAL: 154 MS
Q-T INTERVAL: 378 MS
QRS DURATION: 72 MS
QTC CALCULATION (BEZET): 410 MS
R AXIS: 58 DEGREES
T AXIS: 30 DEGREES
VENTRICULAR RATE: 71 BPM

## 2023-12-02 ENCOUNTER — HOSPITAL ENCOUNTER (OUTPATIENT)
Age: 43
Discharge: HOME | End: 2023-12-02
Payer: COMMERCIAL

## 2023-12-02 VITALS
SYSTOLIC BLOOD PRESSURE: 106 MMHG | DIASTOLIC BLOOD PRESSURE: 75 MMHG | OXYGEN SATURATION: 98 % | TEMPERATURE: 99 F | HEART RATE: 67 BPM | RESPIRATION RATE: 18 BRPM | BODY MASS INDEX: 28 KG/M2 | WEIGHT: 163 LBS

## 2023-12-02 DIAGNOSIS — R31.9 URINARY TRACT INFECTION WITH HEMATURIA, SITE UNSPECIFIED: Primary | ICD-10-CM

## 2023-12-02 DIAGNOSIS — N39.0 URINARY TRACT INFECTION WITH HEMATURIA, SITE UNSPECIFIED: Primary | ICD-10-CM

## 2023-12-02 LAB
B-HCG UR QL: NEGATIVE
BILIRUB UR QL STRIP: NEGATIVE
COLOR UR: YELLOW
GLUCOSE UR STRIP-MCNC: NEGATIVE MG/DL
KETONES UR STRIP-MCNC: NEGATIVE MG/DL
NITRITE UR QL STRIP: POSITIVE
PH UR STRIP: 7 [PH]
PROT UR STRIP-MCNC: 100 MG/DL
SP GR UR STRIP: 1.02
UROBILINOGEN UR STRIP-ACNC: <2 MG/DL

## 2023-12-02 PROCEDURE — 87086 URINE CULTURE/COLONY COUNT: CPT | Performed by: PHYSICIAN ASSISTANT

## 2023-12-02 PROCEDURE — 87077 CULTURE AEROBIC IDENTIFY: CPT | Performed by: PHYSICIAN ASSISTANT

## 2023-12-02 PROCEDURE — 87186 SC STD MICRODIL/AGAR DIL: CPT | Performed by: PHYSICIAN ASSISTANT

## 2023-12-02 RX ORDER — NITROFURANTOIN 25; 75 MG/1; MG/1
100 CAPSULE ORAL 2 TIMES DAILY
Qty: 10 CAPSULE | Refills: 0 | Status: SHIPPED | OUTPATIENT
Start: 2023-12-02 | End: 2023-12-07

## 2023-12-02 RX ORDER — FLUCONAZOLE 200 MG/1
200 TABLET ORAL DAILY
Qty: 1 TABLET | Refills: 0 | Status: SHIPPED | OUTPATIENT
Start: 2023-12-02

## 2023-12-02 NOTE — ED INITIAL ASSESSMENT (HPI)
Pt states started having pain on urination since this am.   Lower abd pressure when urinating. Denies back pain, denies fever.

## 2023-12-02 NOTE — DISCHARGE INSTRUCTIONS
Please return to the ER/clinic if symptoms worsen. Follow-up with your PCP in 24-48 hours as needed. Push fluids. Take the full course antibiotics as prescribed in tandem with a probiotic daily. Will contact you with the results of the culture. Take the Diflucan as needed. Follow-up with your primary care physician for further evaluation and treatment.

## 2023-12-08 ENCOUNTER — HOSPITAL ENCOUNTER (EMERGENCY)
Facility: HOSPITAL | Age: 43
Discharge: HOME | End: 2023-12-08
Attending: EMERGENCY MEDICINE
Payer: COMMERCIAL

## 2023-12-08 VITALS
TEMPERATURE: 99 F | HEIGHT: 64 IN | OXYGEN SATURATION: 97 % | WEIGHT: 163 LBS | BODY MASS INDEX: 27.83 KG/M2 | SYSTOLIC BLOOD PRESSURE: 101 MMHG | RESPIRATION RATE: 18 BRPM | HEART RATE: 91 BPM | DIASTOLIC BLOOD PRESSURE: 67 MMHG

## 2023-12-08 DIAGNOSIS — N30.00 ACUTE CYSTITIS WITHOUT HEMATURIA: Primary | ICD-10-CM

## 2023-12-08 LAB
B-HCG UR QL: NEGATIVE
BILIRUB UR QL STRIP.AUTO: NEGATIVE
CLARITY UR REFRACT.AUTO: CLEAR
COLOR UR AUTO: YELLOW
GLUCOSE UR STRIP.AUTO-MCNC: NORMAL MG/DL
LEUKOCYTE ESTERASE UR QL STRIP.AUTO: 75
NITRITE UR QL STRIP.AUTO: NEGATIVE
PH UR STRIP.AUTO: 6 [PH] (ref 5–8)
PROT UR STRIP.AUTO-MCNC: 20 MG/DL
RBC UR QL AUTO: NEGATIVE
SP GR UR STRIP.AUTO: >1.03 (ref 1–1.03)
UROBILINOGEN UR STRIP.AUTO-MCNC: 2 MG/DL

## 2023-12-08 PROCEDURE — 81025 URINE PREGNANCY TEST: CPT

## 2023-12-08 PROCEDURE — 87086 URINE CULTURE/COLONY COUNT: CPT | Performed by: EMERGENCY MEDICINE

## 2023-12-08 PROCEDURE — 81001 URINALYSIS AUTO W/SCOPE: CPT | Performed by: EMERGENCY MEDICINE

## 2023-12-08 PROCEDURE — 99283 EMERGENCY DEPT VISIT LOW MDM: CPT

## 2023-12-08 PROCEDURE — 99284 EMERGENCY DEPT VISIT MOD MDM: CPT

## 2023-12-08 RX ORDER — FLUCONAZOLE 150 MG/1
150 TABLET ORAL EVERY OTHER DAY
Qty: 2 TABLET | Refills: 2 | Status: SHIPPED | OUTPATIENT
Start: 2023-12-08 | End: 2023-12-11

## 2023-12-08 RX ORDER — METRONIDAZOLE 500 MG/1
500 TABLET ORAL 2 TIMES DAILY
Qty: 14 TABLET | Refills: 0 | Status: SHIPPED | OUTPATIENT
Start: 2023-12-08 | End: 2023-12-15

## 2023-12-08 RX ORDER — SULFAMETHOXAZOLE AND TRIMETHOPRIM 800; 160 MG/1; MG/1
1 TABLET ORAL ONCE
Status: COMPLETED | OUTPATIENT
Start: 2023-12-08 | End: 2023-12-08

## 2023-12-08 RX ORDER — SULFAMETHOXAZOLE AND TRIMETHOPRIM 800; 160 MG/1; MG/1
1 TABLET ORAL 2 TIMES DAILY
Qty: 14 TABLET | Refills: 0 | Status: SHIPPED | OUTPATIENT
Start: 2023-12-08 | End: 2023-12-15

## 2023-12-08 RX ORDER — FLUCONAZOLE 150 MG/1
150 TABLET ORAL EVERY OTHER DAY
Qty: 2 TABLET | Refills: 0 | Status: SHIPPED | OUTPATIENT
Start: 2023-12-08 | End: 2023-12-11

## 2023-12-09 NOTE — ED INITIAL ASSESSMENT (HPI)
Pt presents to ED for constant yeast or bacterial infections, recent UTI just finished antibiotics, now having discharge.

## 2023-12-15 PROBLEM — L30.9 ECZEMA: Status: ACTIVE | Noted: 2022-10-31

## 2023-12-15 PROBLEM — M25.852 LEFT HIP IMPINGEMENT SYNDROME: Status: ACTIVE | Noted: 2022-11-14

## 2023-12-15 PROBLEM — G89.29 HIP PAIN, CHRONIC, LEFT: Status: ACTIVE | Noted: 2022-11-14

## 2023-12-15 PROBLEM — M25.552 HIP PAIN, CHRONIC, LEFT: Status: ACTIVE | Noted: 2022-11-14

## 2023-12-15 PROBLEM — J45.901 ASTHMA WITH ACUTE EXACERBATION: Status: ACTIVE | Noted: 2021-06-04

## 2023-12-15 PROBLEM — J45.901 ASTHMA WITH ACUTE EXACERBATION (HCC): Status: ACTIVE | Noted: 2021-06-04

## 2023-12-15 PROBLEM — E66.3 OVERWEIGHT WITH BODY MASS INDEX (BMI) 25.0-29.9: Status: ACTIVE | Noted: 2018-05-18

## 2023-12-18 ENCOUNTER — LAB ENCOUNTER (OUTPATIENT)
Dept: LAB | Age: 43
End: 2023-12-18
Attending: OBSTETRICS & GYNECOLOGY
Payer: COMMERCIAL

## 2023-12-18 DIAGNOSIS — Z13.1 SCREENING FOR DIABETES MELLITUS (DM): ICD-10-CM

## 2023-12-18 LAB
ALBUMIN SERPL-MCNC: 3.8 G/DL (ref 3.4–5)
ALBUMIN/GLOB SERPL: 1 {RATIO} (ref 1–2)
ALP LIVER SERPL-CCNC: 57 U/L
ALT SERPL-CCNC: 12 U/L
ANION GAP SERPL CALC-SCNC: 3 MMOL/L (ref 0–18)
AST SERPL-CCNC: 11 U/L (ref 15–37)
BILIRUB SERPL-MCNC: 0.4 MG/DL (ref 0.1–2)
BUN BLD-MCNC: 10 MG/DL (ref 9–23)
CALCIUM BLD-MCNC: 8.5 MG/DL (ref 8.5–10.1)
CHLORIDE SERPL-SCNC: 106 MMOL/L (ref 98–112)
CO2 SERPL-SCNC: 27 MMOL/L (ref 21–32)
CREAT BLD-MCNC: 0.75 MG/DL
EGFRCR SERPLBLD CKD-EPI 2021: 101 ML/MIN/1.73M2 (ref 60–?)
EST. AVERAGE GLUCOSE BLD GHB EST-MCNC: 108 MG/DL (ref 68–126)
FASTING STATUS PATIENT QL REPORTED: YES
GLOBULIN PLAS-MCNC: 3.7 G/DL (ref 2.8–4.4)
GLUCOSE BLD-MCNC: 85 MG/DL (ref 70–99)
HBA1C MFR BLD: 5.4 % (ref ?–5.7)
INSULIN SERPL-ACNC: 10 MU/L (ref 3–25)
OSMOLALITY SERPL CALC.SUM OF ELEC: 280 MOSM/KG (ref 275–295)
POTASSIUM SERPL-SCNC: 3.7 MMOL/L (ref 3.5–5.1)
PROT SERPL-MCNC: 7.5 G/DL (ref 6.4–8.2)
SODIUM SERPL-SCNC: 136 MMOL/L (ref 136–145)

## 2023-12-18 PROCEDURE — 83036 HEMOGLOBIN GLYCOSYLATED A1C: CPT

## 2023-12-18 PROCEDURE — 80053 COMPREHEN METABOLIC PANEL: CPT

## 2023-12-18 PROCEDURE — 83525 ASSAY OF INSULIN: CPT

## 2024-02-07 ENCOUNTER — E-VISIT (OUTPATIENT)
Dept: TELEHEALTH | Age: 44
End: 2024-02-07
Payer: COMMERCIAL

## 2024-02-07 DIAGNOSIS — N89.8 VAGINAL DISCHARGE: Primary | ICD-10-CM

## 2024-02-07 DIAGNOSIS — R10.9 LEFT SIDED ABDOMINAL PAIN: ICD-10-CM

## 2024-02-07 PROCEDURE — 99421 OL DIG E/M SVC 5-10 MIN: CPT | Performed by: NURSE PRACTITIONER

## 2024-02-08 ENCOUNTER — TELEPHONE (OUTPATIENT)
Dept: OBGYN CLINIC | Facility: CLINIC | Age: 44
End: 2024-02-08

## 2024-02-08 NOTE — TELEPHONE ENCOUNTER
Pt scheduled to see TA on 03/07/24 for her yearly annual.     Pt voices she began having symptoms of BV last week. Pt had Metrogel at home, and began taking Metrogel once daily for 5 days. Pt now feels she has a yeast infection. Pt is having white vaginal discharge with itching. Pt requesting Diflucan and would like to have 2-3 days ordered for her since 1 Diflucan pill does not seem to help.

## 2024-02-08 NOTE — TELEPHONE ENCOUNTER
Pt is calling thinks she ahsvaginal infection pt did metrogel and symptoms came back ,  Pt thinks she now has yeast infection ,

## 2024-02-09 ENCOUNTER — OFFICE VISIT (OUTPATIENT)
Dept: OBGYN CLINIC | Facility: CLINIC | Age: 44
End: 2024-02-09

## 2024-02-09 ENCOUNTER — TELEPHONE (OUTPATIENT)
Dept: OBGYN CLINIC | Facility: CLINIC | Age: 44
End: 2024-02-09

## 2024-02-09 VITALS
DIASTOLIC BLOOD PRESSURE: 76 MMHG | WEIGHT: 166 LBS | BODY MASS INDEX: 28.34 KG/M2 | HEIGHT: 64 IN | SYSTOLIC BLOOD PRESSURE: 114 MMHG

## 2024-02-09 DIAGNOSIS — R10.32 LLQ ABDOMINAL PAIN: ICD-10-CM

## 2024-02-09 DIAGNOSIS — N76.0 RECURRENT VAGINITIS: ICD-10-CM

## 2024-02-09 DIAGNOSIS — N76.0 VAGINITIS AND VULVOVAGINITIS: Primary | ICD-10-CM

## 2024-02-09 PROCEDURE — 3008F BODY MASS INDEX DOCD: CPT | Performed by: OBSTETRICS & GYNECOLOGY

## 2024-02-09 PROCEDURE — 3074F SYST BP LT 130 MM HG: CPT | Performed by: OBSTETRICS & GYNECOLOGY

## 2024-02-09 PROCEDURE — 99214 OFFICE O/P EST MOD 30 MIN: CPT | Performed by: OBSTETRICS & GYNECOLOGY

## 2024-02-09 PROCEDURE — 3078F DIAST BP <80 MM HG: CPT | Performed by: OBSTETRICS & GYNECOLOGY

## 2024-02-09 RX ORDER — ESTRADIOL 1 MG/G
1 GEL TOPICAL DAILY
Qty: 3 EACH | Refills: 0 | COMMUNITY
Start: 2024-02-09

## 2024-02-09 RX ORDER — ESTRADIOL 1 MG/G
1 GEL TOPICAL DAILY
Qty: 90 EACH | Refills: 4 | Status: SHIPPED | OUTPATIENT
Start: 2024-02-09

## 2024-02-09 NOTE — PROGRESS NOTES
Chief Complaint   Patient presents with    Vaginal Problem         Meg N Daniel Helm is a 44 year old female who presents for vaginal discharge.    LMP: hyst.    Menses regular: n/a.    Menstrual flow normal: n/a.    Birth control or HRT: .   Refill   Last Pap Smear: 2022 . Any history of abnormal paps: no   Gardasil:(age 9-46 y/o) n/a.   Any medication refills needed today?: no    Problems/concerns: patient c/o vaginal discharge and irritation. During the last 6 months she has been treated for BV and yeast. Feels like her urine is cloudy as well her pcp did urine testing yesterday. Has noticed pain and inflammation in her LLQ.     Next Appt:         Immunization History   Administered Date(s) Administered    FLUZONE 6 months and older PFS 0.5 ml (70833) 09/28/2017, 12/03/2018, 01/07/2021, 10/31/2022    TDAP 12/03/2018         Current Outpatient Medications:     linaCLOtide (LINZESS) 72 MCG Oral Cap, Take 72 mcg by mouth daily., Disp: 30 capsule, Rfl: 5    metroNIDAZOLE (METROGEL) 1 % External Gel, Apply 1 Application topically daily. Vaginally at bedtime x 7 nights then weekly for 15 weeks, Disp: 22 each, Rfl: 1    Alclometasone Dipropionate 0.05 % External Ointment, APPLY TOPICALLY DAILY AS DIRECTED TO FACE, Disp: , Rfl:     BREO ELLIPTA 100-25 MCG/INH Inhalation Aerosol Powder, Breath Activated, Inhale 1 puff into the lungs daily., Disp: , Rfl:     montelukast 10 MG Oral Tab, Take 1 tablet (10 mg total) by mouth nightly., Disp: , Rfl:     Albuterol Sulfate HFA (VENTOLIN) 108 (90 BASE) MCG/ACT Inhalation Aero Soln, Inhale into the lungs every 6 (six) hours as needed for Wheezing., Disp: , Rfl:     PEG 3350-KCl-Na Bicarb-NaCl 420 g Oral Recon Soln, Take as directed by your doctor (Patient not taking: Reported on 2/9/2024), Disp: 4000 mL, Rfl: 0    fluconazole (DIFLUCAN) 200 MG Oral Tab, Take 1 tablet (200 mg total) by mouth daily. (Patient not taking: Reported on 2/9/2024), Disp: 1 tablet, Rfl:  0    Allergies   Allergen Reactions    Iodine (Topical) HIVES    Oxycodone-Acetaminophen HIVES and RASH    Radiology Contrast Iodinated Dyes HIVES    Sulfamethoxazole W/Trimethoprim RASH     Most likely cause of rash.  Also was taking macrobid, flagyl, diflucan.     Most likely cause of rash.  Also was taking macrobid, flagyl, diflucan.       OB History    Para Term  AB Living   1 1 0 0 0 1   SAB IAB Ectopic Multiple Live Births   0 0 0 0 0      # Outcome Date GA Lbr Rc/2nd Weight Sex Delivery Anes PTL Lv   1 Para 89   9 lb 11 oz (4.394 kg) M CS-Unspec          Past Medical History:   Diagnosis Date    Abdominal pain     Asthma     Back pain     Belching     Bloating     Blood in the stool     Change in hair     Constipation     Decorative tattoo     Extrinsic asthma, unspecified     Fatigue     Flatulence/gas pain/belching     Frequent use of laxatives     Hemorrhoids     Irregular bowel habits     Nausea     Night sweats     Pain in joints     Stress     Wears glasses     Weight gain     Wheezing        Past Surgical History:   Procedure Laterality Date    COLONOSCOPY      HYSTERECTOMY      SUPRACERV ABD HYSTERECTOMY         History reviewed. No pertinent family history.     Tobacco  Allergies  Meds  Med Hx  Surg Hx  Fam Hx  Soc Hx        Social History     Socioeconomic History    Marital status:      Spouse name: Not on file    Number of children: Not on file    Years of education: Not on file    Highest education level: Not on file   Occupational History    Not on file   Tobacco Use    Smoking status: Never     Passive exposure: Never    Smokeless tobacco: Never   Vaping Use    Vaping Use: Never used   Substance and Sexual Activity    Alcohol use: Yes     Alcohol/week: 1.0 standard drink of alcohol     Types: 1 Glasses of wine per week     Comment: \"occasionally\"    Drug use: Never    Sexual activity: Yes     Partners: Male   Other Topics Concern    Not on file   Social History  Narrative    Not on file     Social Determinants of Health     Financial Resource Strain: Not on file   Food Insecurity: Not on file   Transportation Needs: Not on file   Physical Activity: Not on file   Stress: Not on file   Social Connections: Not on file   Housing Stability: Not on file     /76   Ht 5' 4\" (1.626 m)   Wt 166 lb (75.3 kg)     Wt Readings from Last 3 Encounters:   02/09/24 166 lb (75.3 kg)   12/15/23 166 lb 12.8 oz (75.7 kg)   12/08/23 163 lb (73.9 kg)       Health Maintenance   Topic Date Due    Annual Physical  Never done    Asthma Action Plan  Never done    Asthma Control Test  Never done    COVID-19 Vaccine (1) Never done    Pneumococcal Vaccine: Birth to 64yrs (1 of 2 - PCV) Never done    Annual Depression Screening  01/01/2024    Mammogram  03/24/2024    Pap Smear  01/14/2025    Colorectal Cancer Screening  11/03/2028    DTaP,Tdap,and Td Vaccines (2 - Td or Tdap) 12/03/2028    Influenza Vaccine  Completed         Review of Systems   General: Present- Feeling well. Not Present- Fever.  Female Genitourinary: Not Present- Dysmenorrhea, Dyspareunia, Flank Pain, Frequency, Menstrual Irregularities, Pelvic Pain, Urgency, Urinary Complaints, Vaginal Bleeding and Vaginal dryness.  Pain: Present- Pain Rating - 0 on a 0-10 scale.  All other systems negative       Physical Exam   The physical exam findings are as follows:     Abdomen   Inspection: - Inspection Normal.  Palpation/Percussion: Palpation and Percussion of the abdomen reveal - Non Tender, No hepatosplenomegaly and No Palpable abdominal masses.    Female Genitourinary     External Genitalia   Perineum - Normal. Bartholin's Gland - Bilateral - Normal. Clitoris - Normal.  Introitus: Characteristics - Normal. Discharge - None.  Labia Majora: Lesions - Bilateral - None. Characteristics - Bilateral - Normal.  Labia Minora: Lesions - Bilateral - None. Characteristics - Bilateral - Normal.  Urethra: Characteristics - Normal. Discharge -  None.  [place] Gland - Bilateral - Normal.  Vulva: Characteristics - Normal. Lesions - None.    Speculum & Bimanual   Vagina:   Vaginal Wall: - Normal.  Vaginal Lesions - None. Vaginal Mucosa - Normal.  Cervix: Characteristics - No Motion tenderness. Discharge - None.  Uterus: Characteristics - Non Tender. Position - Midposition.  Adnexa: Characteristics - Bilateral - Tender. Masses - No Adnexal Masses.  Bladder - Normal.      Rectal   Anorectal Exam: External - normal external exam.    Lymphatic  General Lymphatics   Description - Normal .      1. Vaginitis and vulvovaginitis    2. LLQ abdominal pain

## 2024-02-09 NOTE — PROGRESS NOTES
Meg Helm is a 44 year old female.  HPI:   See answers to questions above.     Current Outpatient Medications   Medication Sig Dispense Refill    PEG 3350-KCl-Na Bicarb-NaCl 420 g Oral Recon Soln Take as directed by your doctor 4000 mL 0    fluconazole (DIFLUCAN) 200 MG Oral Tab Take 1 tablet (200 mg total) by mouth daily. 1 tablet 0    linaCLOtide (LINZESS) 72 MCG Oral Cap Take 72 mcg by mouth daily. 30 capsule 5    metroNIDAZOLE (METROGEL) 1 % External Gel Apply 1 Application topically daily. Vaginally at bedtime x 7 nights then weekly for 15 weeks 22 each 1    Alclometasone Dipropionate 0.05 % External Ointment APPLY TOPICALLY DAILY AS DIRECTED TO FACE      BREO ELLIPTA 100-25 MCG/INH Inhalation Aerosol Powder, Breath Activated Inhale 1 puff into the lungs daily.      montelukast 10 MG Oral Tab Take 1 tablet (10 mg total) by mouth nightly.      Albuterol Sulfate HFA (VENTOLIN) 108 (90 BASE) MCG/ACT Inhalation Aero Soln Inhale into the lungs every 6 (six) hours as needed for Wheezing.        Past Medical History:   Diagnosis Date    Abdominal pain     Asthma     Back pain     Belching     Bloating     Blood in the stool     Change in hair     Constipation     Decorative tattoo     Extrinsic asthma, unspecified     Fatigue     Flatulence/gas pain/belching     Frequent use of laxatives     Hemorrhoids     Irregular bowel habits     Nausea     Night sweats     Pain in joints     Stress     Wears glasses     Weight gain     Wheezing       Past Surgical History:   Procedure Laterality Date    COLONOSCOPY      HYSTERECTOMY      SUPRACERV ABD HYSTERECTOMY        No family history on file.   Social History:  Social History     Socioeconomic History    Marital status:    Tobacco Use    Smoking status: Never     Passive exposure: Never    Smokeless tobacco: Never   Vaping Use    Vaping Use: Never used   Substance and Sexual Activity    Alcohol use: Yes     Alcohol/week: 1.0 standard drink of alcohol      Types: 1 Glasses of wine per week     Comment: \"occasionally\"    Drug use: Never    Sexual activity: Yes     Partners: Male         ASSESSMENT AND PLAN:     Encounter Diagnoses   Name Primary?    Vaginal discharge Yes    Left sided abdominal pain            Meds & Refills for this Visit:  Requested Prescriptions      No prescriptions requested or ordered in this encounter     After discussion, it was determined that patient would benefit for further evaluation. PT reported left sided abdominal pain with current symptoms. Will need further work up. Recommended to be seen in [facility]. Directions and information given.     Duration of  the service:  10 minutes    Patient advised to follow up with PCP if no improvement or worsening of symptoms  Refer to [employer]BLOX message for specific patient instructions    Meg N Daniel Helm understands evisit evaluation is not a substitute for face-to-face examination or emergency care. Patient advised to go to ER or call 911 for worsening symptoms or acute distress.

## 2024-02-13 ENCOUNTER — HOSPITAL ENCOUNTER (OUTPATIENT)
Dept: ULTRASOUND IMAGING | Age: 44
Discharge: HOME | End: 2024-02-13
Attending: OBSTETRICS & GYNECOLOGY
Payer: COMMERCIAL

## 2024-02-13 DIAGNOSIS — R10.32 LLQ ABDOMINAL PAIN: ICD-10-CM

## 2024-02-13 PROCEDURE — 76856 US EXAM PELVIC COMPLETE: CPT | Performed by: OBSTETRICS & GYNECOLOGY

## 2024-02-13 PROCEDURE — 93975 VASCULAR STUDY: CPT | Performed by: OBSTETRICS & GYNECOLOGY

## 2024-02-13 PROCEDURE — 76830 TRANSVAGINAL US NON-OB: CPT | Performed by: OBSTETRICS & GYNECOLOGY

## 2024-02-14 NOTE — PROGRESS NOTES
Hemorrhagic cyst f/u ultrasound in 6 weeks to ensure resolution and offer ocps to decrease likelyhood of recurrence     Adeline Thompson MD

## 2024-02-15 ENCOUNTER — TELEPHONE (OUTPATIENT)
Dept: OBGYN CLINIC | Facility: CLINIC | Age: 44
End: 2024-02-15

## 2024-02-15 DIAGNOSIS — N83.202 CYST OF OVARY, LEFT: Primary | ICD-10-CM

## 2024-02-15 RX ORDER — KETOROLAC TROMETHAMINE 10 MG/1
10 TABLET, FILM COATED ORAL EVERY 6 HOURS PRN
Qty: 20 TABLET | Refills: 1 | Status: SHIPPED | OUTPATIENT
Start: 2024-02-15

## 2024-02-15 RX ORDER — NORGESTIMATE AND ETHINYL ESTRADIOL 0.25-0.035
1 KIT ORAL DAILY
Qty: 84 TABLET | Refills: 4 | Status: SHIPPED | OUTPATIENT
Start: 2024-02-15

## 2024-02-15 NOTE — TELEPHONE ENCOUNTER
Called patient to inform of test results and recommendations by Dr. Thompson. Pt reported having severe  pain, would like something to help. Spoke with Dr. Thompson and she recommended Toradol and OCP's. Patient voiced understanding and will do both and repeat US in 6w. Advised to call with any concerns.

## 2024-03-07 ENCOUNTER — OFFICE VISIT (OUTPATIENT)
Dept: OBGYN CLINIC | Facility: CLINIC | Age: 44
End: 2024-03-07

## 2024-03-07 VITALS
SYSTOLIC BLOOD PRESSURE: 118 MMHG | BODY MASS INDEX: 28.85 KG/M2 | WEIGHT: 169 LBS | HEIGHT: 64 IN | DIASTOLIC BLOOD PRESSURE: 80 MMHG

## 2024-03-07 DIAGNOSIS — Z12.11 SCREENING FOR COLON CANCER: ICD-10-CM

## 2024-03-07 DIAGNOSIS — B96.89 BACTERIAL VAGINOSIS: ICD-10-CM

## 2024-03-07 DIAGNOSIS — N95.1 PERIMENOPAUSE: ICD-10-CM

## 2024-03-07 DIAGNOSIS — N76.0 BACTERIAL VAGINOSIS: ICD-10-CM

## 2024-03-07 DIAGNOSIS — B37.31 VAGINAL YEAST INFECTION: ICD-10-CM

## 2024-03-07 DIAGNOSIS — R10.2 PELVIC PAIN: ICD-10-CM

## 2024-03-07 DIAGNOSIS — Z12.31 ENCOUNTER FOR SCREENING MAMMOGRAM FOR BREAST CANCER: ICD-10-CM

## 2024-03-07 DIAGNOSIS — Z01.419 ENCOUNTER FOR WELL WOMAN EXAM WITH ROUTINE GYNECOLOGICAL EXAM: Primary | ICD-10-CM

## 2024-03-07 PROCEDURE — 99396 PREV VISIT EST AGE 40-64: CPT | Performed by: OBSTETRICS & GYNECOLOGY

## 2024-03-07 PROCEDURE — 3008F BODY MASS INDEX DOCD: CPT | Performed by: OBSTETRICS & GYNECOLOGY

## 2024-03-07 PROCEDURE — 3074F SYST BP LT 130 MM HG: CPT | Performed by: OBSTETRICS & GYNECOLOGY

## 2024-03-07 PROCEDURE — 3079F DIAST BP 80-89 MM HG: CPT | Performed by: OBSTETRICS & GYNECOLOGY

## 2024-03-07 RX ORDER — METRONIDAZOLE 65 MG/5G
1 GEL TOPICAL ONCE
Qty: 5 G | Refills: 0 | Status: SHIPPED | COMMUNITY
Start: 2024-03-07 | End: 2024-03-07

## 2024-03-07 RX ORDER — METRONIDAZOLE 500 MG/1
500 TABLET ORAL 2 TIMES DAILY
Qty: 14 TABLET | Refills: 0 | Status: CANCELLED | OUTPATIENT
Start: 2024-03-07

## 2024-03-07 NOTE — PROGRESS NOTES
[facility]  Obstetrics and Gynecology  Gynecology Visit    Chief Complaint   Patient presents with    Annual           Meg N Daniel Helm is a 44 year old female who presents for Annual exam.    LMP: hyst.    Menses regular: n/a.    Menstrual flow normal: n/a.    Birth control or HRT:  hysterectomy. on OCPs for cysts.  Refill no  Last Pap Smear: 1/14/22.  Any history of abnormal paps: No   Last MMG: 3/24/23  Any Medication Refills needed today?: no  Sleep: 6 hrs.    Diet: fair.    Exercise: No.   Screening labs/Blood work today: No.     Colonoscopy (if over 44 y/o): Cologuard.   Gardasil:(age 9-44 y/o) n/a.   Genetic Cancer screen (if indicated): No.   Flu (Aug-April): 28/24.TDAP (every 10 years) 2018.    Sexually active: No  Relationship: patient reports being in a safe relationship    Additional Problems/concerns: patient c/o pain in LLQ since end of January after cyst rupture. Feels worse with stress, pain makes her limp. Patient tried roxanne oil to help with ovarian cysts and did not help. Pt does not think OCP is helping cysts.    Urinary Pressure- currently on Macrobid for UTI on day 5 of 7 of treatment    Patient positive for BV and yeast 2/9/24. Has not had treatment    Next Appt: will wait to schedule.     Immunization History   Administered Date(s) Administered    FLUZONE 6 months and older PFS 0.5 ml (16229) 09/28/2017, 12/03/2018, 01/07/2021, 10/31/2022, 02/08/2024    TDAP 12/03/2018         Current Outpatient Medications:     Ketorolac Tromethamine 10 MG Oral Tab, Take 1 tablet (10 mg total) by mouth every 6 (six) hours as needed for Pain., Disp: 20 tablet, Rfl: 1    Norgestimate-Eth Estradiol (SPRINTEC 28) 0.25-35 MG-MCG Oral Tab, Take 1 tablet by mouth daily., Disp: 84 tablet, Rfl: 4    Estradiol (DIVIGEL) 1 MG/GM Transdermal Gel, Place 1 Dose onto the skin daily., Disp: 3 each, Rfl: 0    Estradiol (DIVIGEL) 1 MG/GM Transdermal Gel, Place 1 Dose onto the skin daily., Disp: 90 each, Rfl: 4     Boric Acid Vaginal 600 MG Vaginal Suppos, Place 1 suppository vaginally at bedtime., Disp: 30 suppository, Rfl: 0    linaCLOtide (LINZESS) 72 MCG Oral Cap, Take 72 mcg by mouth daily., Disp: 30 capsule, Rfl: 5    Alclometasone Dipropionate 0.05 % External Ointment, APPLY TOPICALLY DAILY AS DIRECTED TO FACE, Disp: , Rfl:     BREO ELLIPTA 100-25 MCG/INH Inhalation Aerosol Powder, Breath Activated, Inhale 1 puff into the lungs daily., Disp: , Rfl:     montelukast 10 MG Oral Tab, Take 1 tablet (10 mg total) by mouth nightly., Disp: , Rfl:     Albuterol Sulfate HFA (VENTOLIN) 108 (90 BASE) MCG/ACT Inhalation Aero Soln, Inhale into the lungs every 6 (six) hours as needed for Wheezing., Disp: , Rfl:     PEG 3350-KCl-Na Bicarb-NaCl 420 g Oral Recon Soln, Take as directed by your doctor (Patient not taking: Reported on 2024), Disp: 4000 mL, Rfl: 0    fluconazole (DIFLUCAN) 200 MG Oral Tab, Take 1 tablet (200 mg total) by mouth daily. (Patient not taking: Reported on 2024), Disp: 1 tablet, Rfl: 0    metroNIDAZOLE (METROGEL) 1 % External Gel, Apply 1 Application topically daily. Vaginally at bedtime x 7 nights then weekly for 15 weeks (Patient not taking: Reported on 3/7/2024), Disp: 22 each, Rfl: 1    Allergies   Allergen Reactions    Iodine (Topical) HIVES    Oxycodone-Acetaminophen HIVES and RASH    Radiology Contrast Iodinated Dyes HIVES    Sulfamethoxazole W/Trimethoprim RASH     Most likely cause of rash.  Also was taking macrobid, flagyl, diflucan.     Most likely cause of rash.  Also was taking macrobid, flagyl, diflucan.       OB History    Para Term  AB Living   1 1 0 0 0 1   SAB IAB Ectopic Multiple Live Births   0 0 0 0 0      # Outcome Date GA Lbr Rc/2nd Weight Sex Delivery Anes PTL Lv   1 Para 89   9 lb 11 oz M CS-Unspec          Past Medical History:   Diagnosis Date    Abdominal pain     Asthma (HCC)     Back pain     Belching     Bloating     Blood in the stool     Change in hair      Constipation     Decorative tattoo     Extrinsic asthma, unspecified     Fatigue     Flatulence/gas pain/belching     Frequent use of laxatives     Hemorrhoids     Irregular bowel habits     Nausea     Night sweats     Pain in joints     Stress     Wears glasses     Weight gain     Wheezing        Past Surgical History:   Procedure Laterality Date    COLONOSCOPY      HYSTERECTOMY      SUPRACERV ABD HYSTERECTOMY         History reviewed. No pertinent family history.     Tobacco  Allergies  Med Hx  Surg Hx  Fam Hx  Soc Hx        Social History     Socioeconomic History    Marital status:      Spouse name: Not on file    Number of children: Not on file    Years of education: Not on file    Highest education level: Not on file   Occupational History    Not on file   Tobacco Use    Smoking status: Never     Passive exposure: Never    Smokeless tobacco: Never   Vaping Use    Vaping Use: Never used   Substance and Sexual Activity    Alcohol use: Yes     Alcohol/week: 1.0 standard drink of alcohol     Types: 1 Glasses of wine per week     Comment: \"occasionally\"    Drug use: Never    Sexual activity: Yes     Partners: Male   Other Topics Concern    Not on file   Social History Narrative    Not on file     Social Determinants of Health     Financial Resource Strain: Not on file   Food Insecurity: Not on file   Transportation Needs: Not on file   Physical Activity: Not on file   Stress: Not on file   Social Connections: Not on file   Housing Stability: Not on file     /80   Ht 5' 4\" (1.626 m)   Wt 168 lb 15.7 oz (76.6 kg)   BMI 29.01 kg/m²   Wt Readings from Last 3 Encounters:   03/07/24 168 lb 15.7 oz (76.6 kg)   02/09/24 166 lb (75.3 kg)   12/15/23 166 lb 12.8 oz (75.7 kg)     Health Maintenance   Topic Date Due    Annual Physical  Never done    Asthma Action Plan  Never done    Asthma Control Test  Never done    Pneumococcal Vaccine: Birth to 64yrs (1 of 2 - PCV) Never done    COVID-19 Vaccine (1 -  2023-24 season) Never done    Mammogram  03/24/2024    Pap Smear  01/14/2025    Colorectal Cancer Screening  11/03/2028    DTaP,Tdap,and Td Vaccines (2 - Td or Tdap) 12/03/2028    Influenza Vaccine  Completed    Annual Depression Screening  Completed       Review of Systems   General: Present- Feeling well. Not Present- Chills, Fever, Weight Gain and Weight Loss.  HEENT: Not Present- Headache and Sore Throat.  Respiratory: Not Present- Cough, Difficulty Breathing, Hemoptysis and Sputum Production.  Breast: Not Present- Breast Mass, Breast Pain and Nipple Discharge.  Cardiovascular: Not Present- Chest Pain, Elevated Blood Pressure, Fainting / Blacking Out and Shortness of Breath.  Gastrointestinal: Not Present- Bloody Stool, Constipation, Diarrhea, Heartburn, Nausea and Vomiting.  Female Genitourinary: Not Present- Discharge, Dysuria, Frequency, Incontinence, Pelvic Pain and Urgency. Present- Pressure  Musculoskeletal: Not Present- Leg Cramps and Swelling of Extremities.  Neurological: Not Present- Dizziness and Headaches.  Psychiatric: Not Present- Anxiety and Depression.  Endocrine: Not Present- Appetite Changes, Hair Changes and Thyroid Problems.  Hematology: Not Present- Blood Clots, Easy Bruising and Excessive bleeding.  All other systems negative     Physical Exam   The physical exam findings are as follows:     General   Mental Status - Alert. General Appearance - Cooperative. Orientation - Oriented X4. Build & Nutrition - Well nourished.    Head and Neck  Thyroid   Gland Characteristics - normal size and consistency.    Chest and Lung Exam   Inspection:   Chest Wall: - Normal.  Palpation: - Palpation normal.  Auscultation:   Breath sounds: - Normal.  Adventitious sounds: - No Adventitious sounds.      Breast   Nipples: Characteristics - Bilateral - Normal. Discharge - Bilateral - None.  Breast - Bilateral - Symmetric.          Cardiovascular   Auscultation: Rhythm - Regular. Heart Sounds - Normal heart  sounds.  Murmurs & Other Heart Sounds: Auscultation of the heart reveals - No Murmurs.    Abdomen   Inspection: Inspection of the abdomen reveals - No Hernias. Incisional scars -  & EULOGIO scar.  Palpation/Percussion: Palpation and Percussion of the abdomen reveal - No Palpable abdominal masses. Diffusely tender  Liver: - Normal.    Female Genitourinary     External Genitalia   Perineum - Normal. Bartholin's Gland - Bilateral - Normal. Clitoris - Normal.  Introitus: Characteristics - No Cystocele, Enterocele or Rectocele. Discharge - None.  Labia Majora: Lesions - Bilateral - None. Characteristics - Bilateral - Normal.  Urethra: Characteristics - Normal. Discharge - None.  [place] Gland - Bilateral - Normal.  Vulva: Lesions - None.    Speculum & Bimanual   Vagina:   Vaginal Wall: - Normal.  Vaginal Lesions - None. Vaginal Mucosa - Normal.  Cervix: Characteristics - No CMT  Uterus: Characteristics - Surgically absent.  Adnexa: Characteristics - Bilateral - Normal. Masses - No Adnexal Masses. Diffusely tender          Rectal   Anorectal Exam: External - normal external exam.    Peripheral Vascular   Upper Extremity: Inspection - Bilateral - Normal - No Clubbing, No Cyanosis, No Edema, Pulses Intact.  Palpation: - Pulses bilaterally normal.  Lower Extremity: Inspection - Bilateral - Inspection Normal.  Palpation: Edema - Bilateral - No edema.    Neurologic   Mental Status: - Normal.     Lymphatic  General Lymphatics   Description - Normal .    1. Encounter for well woman exam with routine gynecological exam    2. Encounter for screening mammogram for breast cancer    3. Screening for colon cancer    4. Pelvic pain         BV: Nuvessa vaginal gel 1.3% x 1 night given to patient to treat BV. Instructions given to patient    Yeast: Ibrexafungerp citrate 150mg PO x 1 day to treat yeast infection. Instructions given to patient    Patient to stop nightly vaginal Boric Acid tablets. After one night treatment with Nuvessa  patient is to start OTC vaginal probiotics nightly. Handout given to patient    Discussed vaginal health and how it can take a few month to balance vaginal nolvia.    Patient to continue Divigel for perimenopausal symptoms. Patient has taken it for one month. Discussed how it can take a few months for full effect.     Patient to continue to take OCPs to help with ovarian cyst pain. Will follow-up in 2 months. If patient continues to have unresolved ovarian pain will considered oophorectomy.    F/u in 2 months to reassess symptom managment    Assessment and Plan completed by Leonela LIRIANO under the direct supervision of Dr. Thompson    Patient seen and examined, Agree with assessment and plan of care documented by NP student.     Adeline Thompson MD

## 2024-03-18 ENCOUNTER — TELEPHONE (OUTPATIENT)
Dept: OBGYN CLINIC | Facility: CLINIC | Age: 44
End: 2024-03-18

## 2024-03-20 NOTE — TELEPHONE ENCOUNTER
Medication is on back order.  Pharmacy asking if there is a substitute.      BREXAFEMME 150MG TABLET TAKE 2 TABLETS BY MOUTH ONE TIME FOR 1 DOSE         No indicators present

## 2024-03-20 NOTE — TELEPHONE ENCOUNTER
Spoke with patient to discuss other pharmacies that may work in the area. Provided some pharmacies around area that work best. Patient informed this RN if not in stock, she would like alternative such as Diflucan.         Called [facility], medication is not available in any stores around the area. Called [facility], not in stock either, ETA 4/7. Sending to TA for alternatives.

## 2024-03-21 ENCOUNTER — MED REC SCAN ONLY (OUTPATIENT)
Dept: OBGYN CLINIC | Facility: CLINIC | Age: 44
End: 2024-03-21

## 2024-03-21 ENCOUNTER — TELEPHONE (OUTPATIENT)
Dept: OBGYN CLINIC | Facility: CLINIC | Age: 44
End: 2024-03-21

## 2024-03-21 NOTE — TELEPHONE ENCOUNTER
Her specific type of yeast is only treated with this med. Please shop around or have pharmacy order med. Adeline Thompson MD

## 2024-03-25 ENCOUNTER — HOSPITAL ENCOUNTER (OUTPATIENT)
Dept: MAMMOGRAPHY | Age: 44
Discharge: HOME | End: 2024-03-25
Attending: OBSTETRICS & GYNECOLOGY
Payer: COMMERCIAL

## 2024-03-25 DIAGNOSIS — Z12.31 ENCOUNTER FOR SCREENING MAMMOGRAM FOR BREAST CANCER: ICD-10-CM

## 2024-03-25 PROCEDURE — 77067 SCR MAMMO BI INCL CAD: CPT | Performed by: OBSTETRICS & GYNECOLOGY

## 2024-03-25 PROCEDURE — 77063 BREAST TOMOSYNTHESIS BI: CPT | Performed by: OBSTETRICS & GYNECOLOGY

## 2024-03-27 ENCOUNTER — PATIENT MESSAGE (OUTPATIENT)
Dept: OBGYN CLINIC | Facility: CLINIC | Age: 44
End: 2024-03-27

## 2024-03-28 ENCOUNTER — LAB ENCOUNTER (OUTPATIENT)
Dept: LAB | Age: 44
End: 2024-03-28
Attending: OBSTETRICS & GYNECOLOGY
Payer: COMMERCIAL

## 2024-03-28 ENCOUNTER — TELEPHONE (OUTPATIENT)
Dept: OBGYN CLINIC | Facility: CLINIC | Age: 44
End: 2024-03-28

## 2024-03-28 ENCOUNTER — HOSPITAL ENCOUNTER (OUTPATIENT)
Dept: ULTRASOUND IMAGING | Age: 44
Discharge: HOME | End: 2024-03-28
Attending: OBSTETRICS & GYNECOLOGY
Payer: COMMERCIAL

## 2024-03-28 DIAGNOSIS — R39.9 UTI SYMPTOMS: ICD-10-CM

## 2024-03-28 DIAGNOSIS — N83.202 CYST OF OVARY, LEFT: ICD-10-CM

## 2024-03-28 LAB
BILIRUB UR QL STRIP.AUTO: NEGATIVE
CLARITY UR REFRACT.AUTO: CLEAR
COLOR UR AUTO: COLORLESS
GLUCOSE UR STRIP.AUTO-MCNC: NORMAL MG/DL
KETONES UR STRIP.AUTO-MCNC: NEGATIVE MG/DL
LEUKOCYTE ESTERASE UR QL STRIP.AUTO: 250
NITRITE UR QL STRIP.AUTO: NEGATIVE
PH UR STRIP.AUTO: 6.5 [PH] (ref 5–8)
PROT UR STRIP.AUTO-MCNC: NEGATIVE MG/DL
SP GR UR STRIP.AUTO: <1.005 (ref 1–1.03)
UROBILINOGEN UR STRIP.AUTO-MCNC: NORMAL MG/DL

## 2024-03-28 PROCEDURE — 81001 URINALYSIS AUTO W/SCOPE: CPT

## 2024-03-28 PROCEDURE — 76856 US EXAM PELVIC COMPLETE: CPT | Performed by: OBSTETRICS & GYNECOLOGY

## 2024-03-28 PROCEDURE — 87186 SC STD MICRODIL/AGAR DIL: CPT

## 2024-03-28 PROCEDURE — 87086 URINE CULTURE/COLONY COUNT: CPT

## 2024-03-28 PROCEDURE — 87077 CULTURE AEROBIC IDENTIFY: CPT

## 2024-03-28 PROCEDURE — 76830 TRANSVAGINAL US NON-OB: CPT | Performed by: OBSTETRICS & GYNECOLOGY

## 2024-03-28 NOTE — TELEPHONE ENCOUNTER
From: Meg Helm  To: Adeline Thompson  Sent: 3/27/2024 5:34 PM CDT  Subject: Medication Ibrexafungerp Citrate 150 MG Oral Tab Learn tricia    Good Evening,    Dr Thompson, following up in regards to the medication in the subject line. I’m currently at [facility] in [place], I’m advised the medication is on back order. The pharmacist states they made attempts to reach out to the office, but they are not receiving a call back.    What are my options to treat my current infection, since the prescription cannot be filled.    Please give me a call.    Meg,

## 2024-03-28 NOTE — TELEPHONE ENCOUNTER
Pt following up on [employer] message from yesterday. Please advise     Good Evening,     Dr Thompson, following up in regards to the medication in the subject line.  I’m currently at [facility] in [place], I’m advised the medication is on back order.  The pharmacist states they made attempts to reach out to the office, but they are not receiving a call back.     What are my options to treat my current infection, since the prescription cannot be filled.     Please give me a call.     Meg,

## 2024-04-01 RX ORDER — CIPROFLOXACIN 250 MG/1
250 TABLET, FILM COATED ORAL 2 TIMES DAILY
Qty: 6 TABLET | Refills: 0 | Status: SHIPPED | OUTPATIENT
Start: 2024-04-01 | End: 2024-04-04

## 2024-04-04 ENCOUNTER — TELEPHONE (OUTPATIENT)
Dept: OBGYN CLINIC | Facility: CLINIC | Age: 44
End: 2024-04-04

## 2024-04-04 RX ORDER — FLUCONAZOLE 150 MG/1
150 TABLET ORAL ONCE
Qty: 1 TABLET | Refills: 0 | Status: SHIPPED | OUTPATIENT
Start: 2024-04-04 | End: 2024-04-04

## 2024-04-04 NOTE — TELEPHONE ENCOUNTER
Pt Name and  verified.  Patient informed that a yeast infection script was sent to the pharmacy and verbalized understanding.   Pt states that she continues to have the pain on her pelvic area. Advised to complete the abx that was sent over to the pharmacy and if pain still present after tx, she will need to be seen in office. Voiced understanding.

## 2024-04-04 NOTE — TELEPHONE ENCOUNTER
Pt calling about RX for UTI, when she takes it she ends up with a yeast infection. Pt would like medication for yeast infection

## 2024-04-05 ENCOUNTER — HOSPITAL ENCOUNTER (OUTPATIENT)
Dept: CT IMAGING | Facility: HOSPITAL | Age: 44
Discharge: HOME | End: 2024-04-05
Payer: COMMERCIAL

## 2024-04-05 DIAGNOSIS — R10.32 LLQ PAIN: ICD-10-CM

## 2024-04-05 LAB
CREAT BLD-MCNC: 0.7 MG/DL
EGFRCR SERPLBLD CKD-EPI 2021: 109 ML/MIN/1.73M2 (ref 60–?)

## 2024-04-05 PROCEDURE — 82565 ASSAY OF CREATININE: CPT

## 2024-04-05 PROCEDURE — 74177 CT ABD & PELVIS W/CONTRAST: CPT

## 2024-04-17 PROCEDURE — 99284 EMERGENCY DEPT VISIT MOD MDM: CPT

## 2024-04-17 PROCEDURE — 96374 THER/PROPH/DIAG INJ IV PUSH: CPT

## 2024-04-17 PROCEDURE — 96375 TX/PRO/DX INJ NEW DRUG ADDON: CPT

## 2024-04-18 ENCOUNTER — HOSPITAL ENCOUNTER (EMERGENCY)
Facility: HOSPITAL | Age: 44
Discharge: HOME | End: 2024-04-18
Attending: EMERGENCY MEDICINE
Payer: COMMERCIAL

## 2024-04-18 VITALS
RESPIRATION RATE: 16 BRPM | OXYGEN SATURATION: 100 % | TEMPERATURE: 99 F | HEART RATE: 77 BPM | SYSTOLIC BLOOD PRESSURE: 113 MMHG | HEIGHT: 64 IN | WEIGHT: 179 LBS | BODY MASS INDEX: 30.56 KG/M2 | DIASTOLIC BLOOD PRESSURE: 72 MMHG

## 2024-04-18 DIAGNOSIS — L50.9 HIVES: Primary | ICD-10-CM

## 2024-04-18 PROCEDURE — S0028 INJECTION, FAMOTIDINE, 20 MG: HCPCS | Performed by: EMERGENCY MEDICINE

## 2024-04-18 RX ORDER — METHYLPREDNISOLONE SODIUM SUCCINATE 125 MG/2ML
125 INJECTION, POWDER, LYOPHILIZED, FOR SOLUTION INTRAMUSCULAR; INTRAVENOUS ONCE
Status: COMPLETED | OUTPATIENT
Start: 2024-04-18 | End: 2024-04-18

## 2024-04-18 RX ORDER — FAMOTIDINE 10 MG/ML
20 INJECTION, SOLUTION INTRAVENOUS ONCE
Status: COMPLETED | OUTPATIENT
Start: 2024-04-18 | End: 2024-04-18

## 2024-04-18 RX ORDER — PREDNISONE 20 MG/1
40 TABLET ORAL DAILY
Qty: 10 TABLET | Refills: 0 | Status: SHIPPED | OUTPATIENT
Start: 2024-04-18 | End: 2024-04-23

## 2024-04-18 RX ORDER — FAMOTIDINE 20 MG/1
20 TABLET, FILM COATED ORAL 2 TIMES DAILY
Qty: 14 TABLET | Refills: 0 | Status: SHIPPED | OUTPATIENT
Start: 2024-04-18 | End: 2024-04-25

## 2024-04-18 RX ORDER — FEXOFENADINE HCL 180 MG/1
180 TABLET ORAL DAILY
Qty: 30 TABLET | Refills: 0 | Status: SHIPPED | OUTPATIENT
Start: 2024-04-18

## 2024-04-18 RX ORDER — DIPHENHYDRAMINE HYDROCHLORIDE 50 MG/ML
25 INJECTION INTRAMUSCULAR; INTRAVENOUS ONCE
Status: COMPLETED | OUTPATIENT
Start: 2024-04-18 | End: 2024-04-18

## 2024-04-18 NOTE — ED INITIAL ASSESSMENT (HPI)
Developed rashes-generalized after taking Linzess x 1 dose 5 days ago. Not manage by OTC benadryl. Denies any SOB

## 2024-04-18 NOTE — DISCHARGE INSTRUCTIONS
Please follow-up with your primary care physician 1-2 days return to the ER if your symptoms worsen progress or if you have any further concerns.  Please finish course of prednisone as prescribed.  Please take fexofenadine daily for an antihistamine.  Take famotidine as prescribed.  Call allergy immunology to schedule an appointment to be seen as soon as possible.

## 2024-04-18 NOTE — ED PROVIDER NOTES
Patient Seen in: [facility] Emergency Department      History     Chief Complaint   Patient presents with    Medication Reaction    Rash Skin Problem     Stated Complaint: pt states she started a new med and has a rash/hives    Subjective:   HPI    44-year-old female presents ED with complaints of concern for possible allergic reaction to Linzess.  Reports that she took Linzess 5 days ago and immediately after within 30 minutes began experiencing hives.  States it has been itching.  Taking Benadryl without improvement of her symptoms.  Has had persistent hives since then.  No oral edema no difficulty breathing shortness of breath dizziness lightheadedness.  Has tingling this in the past without the symptoms.    Objective:   Past Medical History:    Abdominal pain    Asthma (HCC)    Back pain    Belching    Bloating    Blood in the stool    Change in hair    Constipation    Decorative tattoo    Extrinsic asthma, unspecified    Fatigue    Flatulence/gas pain/belching    Frequent use of laxatives    Hemorrhoids    Irregular bowel habits    Nausea    Night sweats    Pain in joints    Stress    Wears glasses    Weight gain    Wheezing              Past Surgical History:   Procedure Laterality Date    Colonoscopy      Hysterectomy  1999    partial hystero.    Supracerv abd hysterectomy                  Social History     Socioeconomic History    Marital status:    Tobacco Use    Smoking status: Never     Passive exposure: Never    Smokeless tobacco: Never   Vaping Use    Vaping status: Never Used   Substance and Sexual Activity    Alcohol use: Yes     Alcohol/week: 1.0 standard drink of alcohol     Types: 1 Glasses of wine per week     Comment: \"occasionally\"    Drug use: Never    Sexual activity: Yes     Partners: Male     Social Determinants of Health      Received from [facility], [facility]    Social Connections    Received from [facility], [facility]  [facility] Stability              Review of Systems    Positive for stated complaint: pt states she started a new med and has a rash/hives  Other systems are as noted in HPI.  Constitutional and vital signs reviewed.      All other systems reviewed and negative except as noted above.    Physical Exam     ED Triage Vitals [04/17/24 2241]   /86   Pulse 98   Resp 16   Temp 98.8 °F (37.1 °C)   Temp src Temporal   SpO2 98 %   O2 Device None (Room air)       Current:/72   Pulse 77   Temp 98.8 °F (37.1 °C) (Temporal)   Resp 16   Ht 162.6 cm (5' 4\")   Wt 81.2 kg   SpO2 100%   BMI 30.73 kg/m²         Physical Exam  Vitals and nursing note reviewed.   Constitutional:       Appearance: She is well-developed.   HENT:      Head: Normocephalic and atraumatic.   Eyes:      Pupils: Pupils are equal, round, and reactive to light.   Cardiovascular:      Rate and Rhythm: Normal rate and regular rhythm.   Pulmonary:      Effort: Pulmonary effort is normal.      Breath sounds: Normal breath sounds.   Abdominal:      General: Bowel sounds are normal.      Palpations: Abdomen is soft.   Musculoskeletal:         General: No deformity.      Cervical back: Normal range of motion and neck supple.   Skin:     General: Skin is warm and dry.      Capillary Refill: Capillary refill takes less than 2 seconds.      Comments: Hives bilateral upper extremities as well as lower extremities and chest and abdomen   Neurological:      Mental Status: She is alert and oriented to person, place, and time.               ED Course     Labs Reviewed   RAINBOW DRAW LAVENDER   RAINBOW DRAW LIGHT GREEN   RAINBOW DRAW GOLD                      MDM      This is a 44-year-old female presents ED with complaints of generalized hives.  Vital sign stable arrival patient appears nontoxic examination no oral edema posterior pharyngeal edema no Rester distress.  Patient appears stable on examination we will give an IV dose of  Benadryl as well as Pepcid and steroids and reevaluate.  On reevaluation is feeling significantly improved at this time.  Discussed supportive care with prednisone 5-day course Pepcid as well and switching from Benadryl to Allegra.  Follow-up with allergy immunology recommended.  Patient shows understand the plan and is in agreement plan discharged home in stable condition.                                   Medical Decision Making      Disposition and Plan     Clinical Impression:  1. Hives         Disposition:  Discharge  4/18/2024  2:20 am    Follow-up:  Jace Soni MD  [address] [facility] [address]  [place] [address]  [phone]    Call in 1 day(s)      Jesus Pugh MD  [address] [id number] [phone]    Call in 1 day(s)            Medications Prescribed:  Discharge Medication List as of 4/18/2024  2:23 AM        START taking these medications    Details   fexofenadine 180 MG Oral Tab Take 1 tablet (180 mg total) by mouth daily., Normal, Disp-30 tablet, R-0      !! predniSONE 20 MG Oral Tab Take 2 tablets (40 mg total) by mouth daily for 5 days., Normal, Disp-10 tablet, R-0      famotidine 20 MG Oral Tab Take 1 tablet (20 mg total) by mouth 2 (two) times daily for 7 days., Normal, Disp-14 tablet, R-0       !! - Potential duplicate medications found. Please discuss with provider.

## 2024-06-12 ENCOUNTER — HOSPITAL ENCOUNTER (EMERGENCY)
Facility: HOSPITAL | Age: 44
Discharge: HOME | End: 2024-06-12
Attending: EMERGENCY MEDICINE
Payer: COMMERCIAL

## 2024-06-12 ENCOUNTER — APPOINTMENT (OUTPATIENT)
Dept: GENERAL RADIOLOGY | Facility: HOSPITAL | Age: 44
End: 2024-06-12
Attending: EMERGENCY MEDICINE
Payer: COMMERCIAL

## 2024-06-12 ENCOUNTER — APPOINTMENT (OUTPATIENT)
Dept: NUCLEAR MEDICINE | Facility: HOSPITAL | Age: 44
End: 2024-06-12
Attending: EMERGENCY MEDICINE
Payer: COMMERCIAL

## 2024-06-12 VITALS
TEMPERATURE: 98 F | WEIGHT: 174 LBS | DIASTOLIC BLOOD PRESSURE: 83 MMHG | HEART RATE: 77 BPM | SYSTOLIC BLOOD PRESSURE: 135 MMHG | OXYGEN SATURATION: 97 % | RESPIRATION RATE: 18 BRPM | BODY MASS INDEX: 30 KG/M2

## 2024-06-12 DIAGNOSIS — J18.9 COMMUNITY ACQUIRED PNEUMONIA OF RIGHT LOWER LOBE OF LUNG: ICD-10-CM

## 2024-06-12 DIAGNOSIS — U07.1 COVID-19: Primary | ICD-10-CM

## 2024-06-12 DIAGNOSIS — R07.89 CHEST PAIN, ATYPICAL: ICD-10-CM

## 2024-06-12 DIAGNOSIS — R19.7 DIARRHEA, UNSPECIFIED TYPE: ICD-10-CM

## 2024-06-12 LAB
ALBUMIN SERPL-MCNC: 3.3 G/DL (ref 3.4–5)
ALBUMIN/GLOB SERPL: 0.9 {RATIO} (ref 1–2)
ALP LIVER SERPL-CCNC: 55 U/L
ALT SERPL-CCNC: 12 U/L
ANION GAP SERPL CALC-SCNC: 6 MMOL/L (ref 0–18)
AST SERPL-CCNC: 14 U/L (ref 15–37)
BASOPHILS # BLD AUTO: 0.01 X10(3) UL (ref 0–0.2)
BASOPHILS NFR BLD AUTO: 0.2 %
BILIRUB SERPL-MCNC: 0.4 MG/DL (ref 0.1–2)
BUN BLD-MCNC: 15 MG/DL (ref 9–23)
CALCIUM BLD-MCNC: 8.8 MG/DL (ref 8.5–10.1)
CHLORIDE SERPL-SCNC: 109 MMOL/L (ref 98–112)
CO2 SERPL-SCNC: 22 MMOL/L (ref 21–32)
CREAT BLD-MCNC: 0.82 MG/DL
D DIMER PPP FEU-MCNC: 1.84 UG/ML FEU (ref ?–0.5)
EGFRCR SERPLBLD CKD-EPI 2021: 90 ML/MIN/1.73M2 (ref 60–?)
EOSINOPHIL # BLD AUTO: 0.19 X10(3) UL (ref 0–0.7)
EOSINOPHIL NFR BLD AUTO: 2.9 %
ERYTHROCYTE [DISTWIDTH] IN BLOOD BY AUTOMATED COUNT: 13.2 %
GLOBULIN PLAS-MCNC: 3.8 G/DL (ref 2.8–4.4)
GLUCOSE BLD-MCNC: 90 MG/DL (ref 70–99)
HCT VFR BLD AUTO: 33.6 %
HGB BLD-MCNC: 12.5 G/DL
IMM GRANULOCYTES # BLD AUTO: 0.01 X10(3) UL (ref 0–1)
IMM GRANULOCYTES NFR BLD: 0.2 %
LYMPHOCYTES # BLD AUTO: 2.13 X10(3) UL (ref 1–4)
LYMPHOCYTES NFR BLD AUTO: 32.3 %
MCH RBC QN AUTO: 30.3 PG (ref 26–34)
MCHC RBC AUTO-ENTMCNC: 37.2 G/DL (ref 31–37)
MCV RBC AUTO: 81.6 FL
MONOCYTES # BLD AUTO: 0.7 X10(3) UL (ref 0.1–1)
MONOCYTES NFR BLD AUTO: 10.6 %
NEUTROPHILS # BLD AUTO: 3.55 X10 (3) UL (ref 1.5–7.7)
NEUTROPHILS # BLD AUTO: 3.55 X10(3) UL (ref 1.5–7.7)
NEUTROPHILS NFR BLD AUTO: 53.8 %
OSMOLALITY SERPL CALC.SUM OF ELEC: 284 MOSM/KG (ref 275–295)
PLATELET # BLD AUTO: 225 10(3)UL (ref 150–450)
POTASSIUM SERPL-SCNC: 3.6 MMOL/L (ref 3.5–5.1)
PROT SERPL-MCNC: 7.1 G/DL (ref 6.4–8.2)
RBC # BLD AUTO: 4.12 X10(6)UL
SODIUM SERPL-SCNC: 137 MMOL/L (ref 136–145)
TROPONIN I SERPL HS-MCNC: <3 NG/L
WBC # BLD AUTO: 6.6 X10(3) UL (ref 4–11)

## 2024-06-12 PROCEDURE — 96375 TX/PRO/DX INJ NEW DRUG ADDON: CPT

## 2024-06-12 PROCEDURE — 85379 FIBRIN DEGRADATION QUANT: CPT | Performed by: EMERGENCY MEDICINE

## 2024-06-12 PROCEDURE — 93010 ELECTROCARDIOGRAM REPORT: CPT

## 2024-06-12 PROCEDURE — 99285 EMERGENCY DEPT VISIT HI MDM: CPT

## 2024-06-12 PROCEDURE — 78580 LUNG PERFUSION IMAGING: CPT | Performed by: EMERGENCY MEDICINE

## 2024-06-12 PROCEDURE — 96374 THER/PROPH/DIAG INJ IV PUSH: CPT

## 2024-06-12 PROCEDURE — 71045 X-RAY EXAM CHEST 1 VIEW: CPT | Performed by: EMERGENCY MEDICINE

## 2024-06-12 PROCEDURE — 96361 HYDRATE IV INFUSION ADD-ON: CPT

## 2024-06-12 PROCEDURE — 80053 COMPREHEN METABOLIC PANEL: CPT | Performed by: EMERGENCY MEDICINE

## 2024-06-12 PROCEDURE — 85025 COMPLETE CBC W/AUTO DIFF WBC: CPT | Performed by: EMERGENCY MEDICINE

## 2024-06-12 PROCEDURE — 93005 ELECTROCARDIOGRAM TRACING: CPT

## 2024-06-12 PROCEDURE — 84484 ASSAY OF TROPONIN QUANT: CPT | Performed by: EMERGENCY MEDICINE

## 2024-06-12 RX ORDER — KETOROLAC TROMETHAMINE 15 MG/ML
15 INJECTION, SOLUTION INTRAMUSCULAR; INTRAVENOUS ONCE
Status: COMPLETED | OUTPATIENT
Start: 2024-06-12 | End: 2024-06-12

## 2024-06-12 RX ORDER — DIPHENHYDRAMINE HYDROCHLORIDE 50 MG/ML
25 INJECTION INTRAMUSCULAR; INTRAVENOUS ONCE
Status: COMPLETED | OUTPATIENT
Start: 2024-06-12 | End: 2024-06-12

## 2024-06-12 RX ORDER — AZITHROMYCIN 250 MG/1
TABLET, FILM COATED ORAL
Qty: 6 TABLET | Refills: 0 | Status: SHIPPED | OUTPATIENT
Start: 2024-06-12 | End: 2024-06-17

## 2024-06-12 RX ORDER — METOCLOPRAMIDE HYDROCHLORIDE 5 MG/ML
10 INJECTION INTRAMUSCULAR; INTRAVENOUS ONCE
Status: COMPLETED | OUTPATIENT
Start: 2024-06-12 | End: 2024-06-12

## 2024-06-12 RX ORDER — CEFUROXIME AXETIL 500 MG/1
500 TABLET ORAL 2 TIMES DAILY
Qty: 20 TABLET | Refills: 0 | Status: SHIPPED | OUTPATIENT
Start: 2024-06-12

## 2024-06-12 NOTE — ED QUICK NOTES
Rounding Completed    Plan of Care reviewed. Waiting for NM.  Elimination needs assessed.  Provided gatorade and warm blanket.    Bed is locked and in lowest position. Call light within reach.

## 2024-06-12 NOTE — ED INITIAL ASSESSMENT (HPI)
44YF c/c of COvid NVD Pt state that she been having a sore throat since Friday tested positive for covid on Monday been having NVD since last night

## 2024-06-12 NOTE — ED PROVIDER NOTES
Patient Seen in: [facility] Emergency Department      History     Chief Complaint   Patient presents with    Covid    Nausea/Vomiting/Diarrhea     Stated Complaint: covid +, diarrhea and fever    Subjective:   HPI    Patient is a 44-year-old woman who presents with multiple complaints.  Patient was driving the [place].  She developed a sore throat and mild congestion.  She tested positive for COVID on Monday since returning from the [place].  She also developed diarrhea.  She has been nauseous with vomiting.  She says she has pain in her right posterior chest that she describes as a gas-like pain.  Took Tums without relief.  She has been belching.  No other ill contacts.  No other specific complaints.  Does have a history of asthma.    Objective:   Past Medical History:    Abdominal pain    Asthma (HCC)    Back pain    Belching    Bloating    Blood in the stool    Change in hair    Constipation    Decorative tattoo    Extrinsic asthma, unspecified    Fatigue    Flatulence/gas pain/belching    Frequent use of laxatives    Hemorrhoids    Irregular bowel habits    Nausea    Night sweats    Pain in joints    Stress    Wears glasses    Weight gain    Wheezing              Past Surgical History:   Procedure Laterality Date    Colonoscopy      Hysterectomy  1999    partial hystero.    Supracerv abd hysterectomy                  Social History     Socioeconomic History    Marital status:    Tobacco Use    Smoking status: Never     Passive exposure: Never    Smokeless tobacco: Never   Vaping Use    Vaping status: Never Used   Substance and Sexual Activity    Alcohol use: Yes     Alcohol/week: 1.0 standard drink of alcohol     Types: 1 Glasses of wine per week     Comment: \"occasionally\"    Drug use: Never    Sexual activity: Yes     Partners: Male     Social Determinants of Health      [facility]    Social Connections    Received  from [facility]    Housing Stability              Review of Systems    Positive for stated complaint: covid +, diarrhea and fever  Other systems are as noted in HPI.  Constitutional and vital signs reviewed.      All other systems reviewed and negative except as noted above.    Physical Exam     ED Triage Vitals [06/12/24 1401]   /88   Pulse 89   Resp 20   Temp 98.1 °F (36.7 °C)   Temp src Temporal   SpO2 98 %   O2 Device None (Room air)       Current Vitals:   Vital Signs  BP: 135/83  Pulse: 77  Resp: 18  Temp: 98.1 °F (36.7 °C)  Temp src: Temporal    Oxygen Therapy  SpO2: 97 %  O2 Device: None (Room air)            Physical Exam    General: Patient is resting comfortably in no acute distress  HEENT: Normal cephalic atraumatic.  Nonicteric sclera.  Dry mucous membranes.  Minimal erythema of the oropharynx.  No significant adenopathy.  No meningismus or trismus.  Lungs: No tachypnea.  Lungs clear to auscultation bilaterally without rales/rhonchi.  Equal breath sounds bilaterally  Cardiac: No tachycardia.  No murmurs.  Regular rate and rhythm.  Abdomen: Soft throughout.  No guarding or rebound extremities: No clubbing/cyanosis/edema.  Skin: No rashes, no pallor  Neuro: Awake oriented ×3.  Nonfocal.  Good strength throughout    ED Course     Labs Reviewed   COMP METABOLIC PANEL (14) - Abnormal; Notable for the following components:       Result Value    AST 14 (*)     ALT 12 (*)     Albumin 3.3 (*)     A/G Ratio 0.9 (*)     All other components within normal limits   D-DIMER - Abnormal; Notable for the following components:    D-Dimer 1.84 (*)     All other components within normal limits   CBC W/ DIFFERENTIAL - Abnormal; Notable for the following components:    HCT 33.6 (*)     MCHC 37.2 (*)     All other components within normal limits   TROPONIN I HIGH SENSITIVITY - Normal   CBC WITH DIFFERENTIAL WITH PLATELET    Narrative:     The following orders were  created for panel order CBC With Differential With Platelet.  Procedure                               Abnormality         Status                     ---------                               -----------         ------                     CBC W/ DIFFERENTIAL[[id number]]          Abnormal            Final result                 Please view results for these tests on the individual orders.     EKG    Rate, intervals and axes as noted on EKG Report.  Rate: 72  Rhythm: Sinus Rhythm  Reading: Normal sinus rhythm.  Rate of 72.  No acute ST-T wave changes.  Axis/intervals are noted.  Otherwise, agree with EKG report                 Chest x-ray: I personally reviewed the films and my independent interpertaion showed right lower lobe infiltrate.  Official report patchy airspace disease in the right lung may represent early pneumonia.     White count 6.6.  D-dimer elevated 1.84.  Electrolytes noted.  Troponin negative.    MDM      44-year-old woman presents with sore throat, congestion, vomiting diarrhea.  Atypical posterior right-sided chest pain.  Differential includes PE, atypical coronary artery disease, gastritis, pleuritis, other.  Will treat with Toradol Reglan Benadryl.  Patient's status post partial hysterectomy.  Will hydrate.  Will treat her nausea.  Will check EKG, troponin, D-dimer.  Will consider CT scan if D-dimer is high.  Will reassess after blood work and treatment.  Possible traveler's diarrhea on top of her COVID.  Would consider 3 days of Zithromax.    D-dimer positive.  Does have posterior atypical chest pain.  Need to rule out PE given her pneumonia/COVID/travel.  Patient has a dye allergy.  VQ scan is now pending.  Patient is resting comfortably otherwise here.  Offered Paxlovid though with nausea we discussed risk benefits and patient declines.  Will treat for possible community-acquired pneumonia given the infiltrate.  Will treat with Ceftin/Zithromax.  Zithromax which cover for traveler's diarrhea as  well.  Otherwise supportive care.  Tylenol/ibuprofen.  Stay well-hydrated.  Follow-up with primary care.    VQ scan negative for PE.  Plan is as above                           Medical Decision Making      Disposition and Plan     Clinical Impression:  1. COVID-19    2. Community acquired pneumonia of right lower lobe of lung    3. Diarrhea, unspecified type    4. Chest pain, atypical         Disposition:  Discharge  6/12/2024  8:38 pm    Follow-up:  Jace Soni MD  [address] [address]  [phone]    Follow up in 1 week(s)            Medications Prescribed:  Current Discharge Medication List        START taking these medications    Details   azithromycin (ZITHROMAX Z-GERTRUDE) 250 MG Oral Tab 500 mg once followed by 250 mg daily x 4 days  Qty: 6 tablet, Refills: 0      cefuroxime 500 MG Oral Tab Take 1 tablet (500 mg total) by mouth 2 (two) times daily.  Qty: 20 tablet, Refills: 0

## 2024-06-13 NOTE — DISCHARGE INSTRUCTIONS
Will treat for possible commune acquired pneumonia.  Ceftin twice a day for 10 days.  Z-Gary.  Zofran for nausea.  Z-Gary would cover traveler's diarrhea as well.  Stay well-hydrated.  Return if worsening symptoms or new complaints.  Return if increasing shortness of breath.

## 2024-06-14 LAB
ATRIAL RATE: 72 BPM
P AXIS: 62 DEGREES
P-R INTERVAL: 158 MS
Q-T INTERVAL: 376 MS
QRS DURATION: 66 MS
QTC CALCULATION (BEZET): 411 MS
R AXIS: 54 DEGREES
T AXIS: 24 DEGREES
VENTRICULAR RATE: 72 BPM

## 2024-06-15 ENCOUNTER — HOSPITAL ENCOUNTER (EMERGENCY)
Facility: HOSPITAL | Age: 44
Discharge: HOME | End: 2024-06-15
Attending: EMERGENCY MEDICINE

## 2024-06-15 ENCOUNTER — APPOINTMENT (OUTPATIENT)
Dept: GENERAL RADIOLOGY | Facility: HOSPITAL | Age: 44
End: 2024-06-15
Attending: EMERGENCY MEDICINE

## 2024-06-15 VITALS
HEIGHT: 64.5 IN | DIASTOLIC BLOOD PRESSURE: 90 MMHG | SYSTOLIC BLOOD PRESSURE: 124 MMHG | HEART RATE: 68 BPM | WEIGHT: 174 LBS | OXYGEN SATURATION: 99 % | RESPIRATION RATE: 16 BRPM | BODY MASS INDEX: 29.35 KG/M2 | TEMPERATURE: 98 F

## 2024-06-15 DIAGNOSIS — R09.1 PLEURISY: Primary | ICD-10-CM

## 2024-06-15 LAB
ATRIAL RATE: 73 BPM
P AXIS: 63 DEGREES
P-R INTERVAL: 160 MS
Q-T INTERVAL: 382 MS
QRS DURATION: 68 MS
QTC CALCULATION (BEZET): 420 MS
R AXIS: 62 DEGREES
T AXIS: 36 DEGREES
VENTRICULAR RATE: 73 BPM

## 2024-06-15 PROCEDURE — 99283 EMERGENCY DEPT VISIT LOW MDM: CPT

## 2024-06-15 PROCEDURE — 93010 ELECTROCARDIOGRAM REPORT: CPT

## 2024-06-15 PROCEDURE — 99284 EMERGENCY DEPT VISIT MOD MDM: CPT

## 2024-06-15 PROCEDURE — 71046 X-RAY EXAM CHEST 2 VIEWS: CPT | Performed by: EMERGENCY MEDICINE

## 2024-06-15 PROCEDURE — 93005 ELECTROCARDIOGRAM TRACING: CPT

## 2024-06-15 RX ORDER — ACETAMINOPHEN 500 MG
500 TABLET ORAL ONCE
Status: COMPLETED | OUTPATIENT
Start: 2024-06-15 | End: 2024-06-15

## 2024-06-15 RX ORDER — IBUPROFEN 200 MG
200 TABLET ORAL ONCE
Status: COMPLETED | OUTPATIENT
Start: 2024-06-15 | End: 2024-06-15

## 2024-06-15 NOTE — ED INITIAL ASSESSMENT (HPI)
Patient here for worsening pain since Tuesday when she was diagnosed with right side pneumonia. Taking abx, and other medications without improvement, last took tylenol at 1000.

## 2024-06-15 NOTE — ED PROVIDER NOTES
Patient Seen in: [facility] Emergency Department      History     Chief Complaint   Patient presents with    Pneumonia     Stated Complaint: pt here on tuesday dx with pna in right lung, pain on right side getting worse *    Subjective:   HPI    44-year-old female who presents here to the emergency room complaining of having pleuritic discomfort in her right chest.  The patient is the pain is getting progressively worse.  Reviewing her record she was seen on 6/12/2024 and had extensive workup performed that included a VQ scan to assess for pulmonary embolism.  VQ scan was read as normal.  The patient does have COVID-19.  She had taken meloxicam 4 hours prior to arriving to the emergency department because of her discomfort.  She is also taken some Tylenol.  She has had a cough but says it has been primarily at night.    Objective:   Past Medical History:    Abdominal pain    Asthma (HCC)    Back pain    Belching    Bloating    Blood in the stool    Change in hair    Constipation    Decorative tattoo    Extrinsic asthma, unspecified    Fatigue    Flatulence/gas pain/belching    Frequent use of laxatives    Hemorrhoids    Irregular bowel habits    Nausea    Night sweats    Pain in joints    Stress    Wears glasses    Weight gain    Wheezing              Past Surgical History:   Procedure Laterality Date    Colonoscopy      Hysterectomy  1999    partial hystero.    Supracerv abd hysterectomy                  Social History     Socioeconomic History    Marital status:    Tobacco Use    Smoking status: Never     Passive exposure: Never    Smokeless tobacco: Never   Vaping Use    Vaping status: Never Used   Substance and Sexual Activity    Alcohol use: Not Currently     Alcohol/week: 1.0 standard drink of alcohol     Types: 1 Glasses of wine per week     Comment: \"occasionally\"    Drug use: Never    Sexual activity: Yes     Partners: Male     Social Determinants of Health      Received from [facility]  [facility], [facility]    Social Connections    Received from [facility], [facility]    Housing Stability              Review of Systems    Positive for stated complaint: pt here on tuesday dx with pna in right lung, pain on right side getting worse *  Other systems are as noted in HPI.  Constitutional and vital signs reviewed.      All other systems reviewed and negative except as noted above.    Physical Exam     ED Triage Vitals [06/15/24 1259]   /80   Pulse 72   Resp 18   Temp 98.4 °F (36.9 °C)   Temp src Oral   SpO2 98 %   O2 Device None (Room air)       Current Vitals:   Vital Signs  BP: 129/84  Pulse: 66  Resp: 17  Temp: 98.4 °F (36.9 °C)  Temp src: Oral  MAP (mmHg): (!) 104    Oxygen Therapy  SpO2: 100 %  O2 Device: None (Room air)            Physical Exam  General: This a pleasant nontoxic appearing patient in no apparent distress alert and oriented ×3  HEENT: Pupils are equal reactive to light.  Extra ocular motions are intact.  No scleral icterus or conjunctival pallor.  Lungs: Clear to auscultation bilaterally.  No wheezes, rhonchi, or rales appreciated.  No accessory muscle use noted for breathing.  Cardiac: Regular rate and rhythm.  Normal S1 and 2 without murmurs or ectopy appreciated  Abdomen: Soft on examination without tenderness to deep palpation or to percussion.  No masses appreciated.  Bowel sounds are normoactive.  No CVA tenderness.  Extremities: No cyanosis, no edema or clubbing.  Pulses are +2.  Full range of motion is noted of the extremities without deformities.  No tenderness.  Neurologically intact.       ED Course   Labs Reviewed - No data to display  EKG    Rate, intervals and axes as noted on EKG Report.  Rate: 73  Rhythm: Sinus Rhythm  Reading: Normal sinus rhythm normal EKG               Narrative  PROCEDURE:  XR CHEST PA + LAT CHEST (CPT=71046)     INDICATIONS:  pt here on tuesday dx with pna in right lung, pain  on right side getting worse after being on abx.     COMPARISON:  [facility] , XR, XR CHEST AP PORTABLE  (CPT=71045), 6/12/2024, 4:09 PM.     TECHNIQUE:  PA and lateral chest radiographs were obtained.     PATIENT STATED HISTORY: (As transcribed by Technologist)  patient states pnumonia and has been having worse chest pain                         Impression  CONCLUSION:       Normal cardiac and mediastinal contours.  No pulmonary edema or focal airspace consolidation.  The pleural spaces are clear.  Regional osseous structures are normal.           LOCATION:  [facility]        Dictated by (CST): Marianna Stallworth MD on 6/15/2024 at 2:21 PM      Finalized by (CST): Marianna Stallworth MD on 6/15/2024 at 2:23 PM             MDM    Differential diagnose includes but is not limited to pleurisy, pneumonia, pneumothorax.  She is presently being treated for pneumonia which may be causing pleurisy.  COVID could also lead to the pleuritic discomfort.  Chest x-ray will be performed to assess for pneumothorax or other infiltrative process that may be contributing to her symptomology.  She received a dose of Tylenol and ibuprofen here in the emergency department.  I reviewed the x-rays and agree with the radiologist report that showed normal cardiac and mediastinal contours.  No pulmonary edema or focal airspace consolidation.  The pleural spaces are clear.  Regional osseous structures are normal.  The patient has pleurisy nonspecific etiology.  She did not appear to be pneumothorax.  With her normal VQ scan from yesterday and history of COVID COVID is likely the etiology of her symptoms at this time.  Continue with symptomatic therapy as an outpatient return if symptoms progress or worsen.  Note to Patient  The 21st Century Cures Act makes medical notes like these available to patients in the interest of transparency. However, be advised this is a medical document and is intended as [id number] communication; it is written in medical language and  may appear blunt, direct, or contain abbreviations or verbiage that are unfamiliar. Medical documents are intended to carry relevant information, facts as evident, and the clinical opinion of the practitioner.  Patient was evaluated and a screening exam was performed.   As a treating physician attending to the patient, I determined, within reasonable clinical confidence and prior to discharge, that an emergency medical condition was not or was no longer present.  There was no indication for further evaluation, treatment or admission on an emergency basis.  Comprehensive verbal and written discharge and follow-up instructions were provided to help prevent relapse or worsening.  Patient was instructed to follow-up with their primary care provider for further evaluation and treatment, but to return immediately to the ER for worsening, concerning, new, changing or persisting symptoms.  I discussed the case with the patient and they had no questions, complaints, or concerns.  Patient felt comfortable going home.  ^^Please note that this report has been produced using speech recognition software and may contain errors related to that system including, but not limited to, errors in grammar, punctuation, and spelling, as well as words and phrases that possibly may have been recognized inappropriately.  If there are any questions or concerns, contact the dictating provider for clarification.                         Medical Decision Making      Disposition and Plan     Clinical Impression:  1. Pleurisy         Disposition:  Discharge  6/15/2024  3:24 pm    Follow-up:  Jace Soni MD  [address] [place] [address]  [phone]    Schedule an appointment as soon as possible for a visit in 2 day(s)            Medications Prescribed:  Current Discharge Medication List

## 2025-03-25 ENCOUNTER — APPOINTMENT (OUTPATIENT)
Dept: CT IMAGING | Facility: HOSPITAL | Age: 45
End: 2025-03-25
Attending: STUDENT IN AN ORGANIZED HEALTH CARE EDUCATION/TRAINING PROGRAM
Payer: COMMERCIAL

## 2025-03-25 ENCOUNTER — HOSPITAL ENCOUNTER (EMERGENCY)
Facility: HOSPITAL | Age: 45
Discharge: HOME | End: 2025-03-25
Attending: STUDENT IN AN ORGANIZED HEALTH CARE EDUCATION/TRAINING PROGRAM
Payer: COMMERCIAL

## 2025-03-25 VITALS
BODY MASS INDEX: 30.05 KG/M2 | OXYGEN SATURATION: 100 % | HEART RATE: 63 BPM | WEIGHT: 176 LBS | DIASTOLIC BLOOD PRESSURE: 75 MMHG | TEMPERATURE: 98 F | HEIGHT: 64 IN | SYSTOLIC BLOOD PRESSURE: 120 MMHG | RESPIRATION RATE: 16 BRPM

## 2025-03-25 DIAGNOSIS — R10.9 ABDOMINAL PAIN OF UNKNOWN ETIOLOGY: Primary | ICD-10-CM

## 2025-03-25 LAB
ALBUMIN SERPL-MCNC: 4.4 G/DL (ref 3.2–4.8)
ALBUMIN/GLOB SERPL: 1.6 {RATIO} (ref 1–2)
ALP LIVER SERPL-CCNC: 51 U/L
ALT SERPL-CCNC: <7 U/L
ANION GAP SERPL CALC-SCNC: 8 MMOL/L (ref 0–18)
AST SERPL-CCNC: 13 U/L (ref ?–34)
BASOPHILS # BLD AUTO: 0.03 X10(3) UL (ref 0–0.2)
BASOPHILS NFR BLD AUTO: 0.5 %
BILIRUB SERPL-MCNC: 0.3 MG/DL (ref 0.3–1.2)
BILIRUB UR QL STRIP.AUTO: NEGATIVE
BUN BLD-MCNC: 10 MG/DL (ref 9–23)
CALCIUM BLD-MCNC: 9.3 MG/DL (ref 8.7–10.6)
CHLORIDE SERPL-SCNC: 105 MMOL/L (ref 98–112)
CLARITY UR REFRACT.AUTO: CLEAR
CO2 SERPL-SCNC: 26 MMOL/L (ref 21–32)
CREAT BLD-MCNC: 0.71 MG/DL
EGFRCR SERPLBLD CKD-EPI 2021: 106 ML/MIN/1.73M2 (ref 60–?)
EOSINOPHIL # BLD AUTO: 0.07 X10(3) UL (ref 0–0.7)
EOSINOPHIL NFR BLD AUTO: 1.1 %
ERYTHROCYTE [DISTWIDTH] IN BLOOD BY AUTOMATED COUNT: 13.2 %
GLOBULIN PLAS-MCNC: 2.8 G/DL (ref 2–3.5)
GLUCOSE BLD-MCNC: 101 MG/DL (ref 70–99)
GLUCOSE UR STRIP.AUTO-MCNC: NORMAL MG/DL
HCT VFR BLD AUTO: 34.4 %
HGB BLD-MCNC: 12.6 G/DL
IMM GRANULOCYTES # BLD AUTO: 0.01 X10(3) UL (ref 0–1)
IMM GRANULOCYTES NFR BLD: 0.2 %
KETONES UR STRIP.AUTO-MCNC: NEGATIVE MG/DL
LEUKOCYTE ESTERASE UR QL STRIP.AUTO: NEGATIVE
LIPASE SERPL-CCNC: 45 U/L (ref 12–53)
LYMPHOCYTES # BLD AUTO: 2.3 X10(3) UL (ref 1–4)
LYMPHOCYTES NFR BLD AUTO: 35.8 %
MCH RBC QN AUTO: 29.6 PG (ref 26–34)
MCHC RBC AUTO-ENTMCNC: 36.6 G/DL (ref 31–37)
MCV RBC AUTO: 80.9 FL
MONOCYTES # BLD AUTO: 0.48 X10(3) UL (ref 0.1–1)
MONOCYTES NFR BLD AUTO: 7.5 %
NEUTROPHILS # BLD AUTO: 3.53 X10 (3) UL (ref 1.5–7.7)
NEUTROPHILS # BLD AUTO: 3.53 X10(3) UL (ref 1.5–7.7)
NEUTROPHILS NFR BLD AUTO: 54.9 %
NITRITE UR QL STRIP.AUTO: NEGATIVE
OSMOLALITY SERPL CALC.SUM OF ELEC: 287 MOSM/KG (ref 275–295)
PH UR STRIP.AUTO: 6.5 [PH] (ref 5–8)
PLATELET # BLD AUTO: 244 10(3)UL (ref 150–450)
POTASSIUM SERPL-SCNC: 4.2 MMOL/L (ref 3.5–5.1)
PROT SERPL-MCNC: 7.2 G/DL (ref 5.7–8.2)
PROT UR STRIP.AUTO-MCNC: NEGATIVE MG/DL
RBC # BLD AUTO: 4.25 X10(6)UL
RBC UR QL AUTO: NEGATIVE
SODIUM SERPL-SCNC: 139 MMOL/L (ref 136–145)
SP GR UR STRIP.AUTO: 1.02 (ref 1–1.03)
UROBILINOGEN UR STRIP.AUTO-MCNC: NORMAL MG/DL
WBC # BLD AUTO: 6.4 X10(3) UL (ref 4–11)

## 2025-03-25 PROCEDURE — 96374 THER/PROPH/DIAG INJ IV PUSH: CPT

## 2025-03-25 PROCEDURE — 74176 CT ABD & PELVIS W/O CONTRAST: CPT | Performed by: STUDENT IN AN ORGANIZED HEALTH CARE EDUCATION/TRAINING PROGRAM

## 2025-03-25 PROCEDURE — 81003 URINALYSIS AUTO W/O SCOPE: CPT | Performed by: STUDENT IN AN ORGANIZED HEALTH CARE EDUCATION/TRAINING PROGRAM

## 2025-03-25 PROCEDURE — 99285 EMERGENCY DEPT VISIT HI MDM: CPT

## 2025-03-25 PROCEDURE — 83690 ASSAY OF LIPASE: CPT | Performed by: STUDENT IN AN ORGANIZED HEALTH CARE EDUCATION/TRAINING PROGRAM

## 2025-03-25 PROCEDURE — 85025 COMPLETE CBC W/AUTO DIFF WBC: CPT | Performed by: STUDENT IN AN ORGANIZED HEALTH CARE EDUCATION/TRAINING PROGRAM

## 2025-03-25 PROCEDURE — 99284 EMERGENCY DEPT VISIT MOD MDM: CPT

## 2025-03-25 PROCEDURE — 80053 COMPREHEN METABOLIC PANEL: CPT | Performed by: STUDENT IN AN ORGANIZED HEALTH CARE EDUCATION/TRAINING PROGRAM

## 2025-03-25 RX ORDER — GABAPENTIN 600 MG/1
600 TABLET ORAL 3 TIMES DAILY
COMMUNITY
Start: 2024-08-01

## 2025-03-25 RX ORDER — IBUPROFEN 800 MG/1
800 TABLET, FILM COATED ORAL EVERY 8 HOURS PRN
Qty: 30 TABLET | Refills: 0 | Status: SHIPPED | OUTPATIENT
Start: 2025-03-25 | End: 2025-04-01

## 2025-03-25 RX ORDER — KETOROLAC TROMETHAMINE 15 MG/ML
15 INJECTION, SOLUTION INTRAMUSCULAR; INTRAVENOUS ONCE
Status: COMPLETED | OUTPATIENT
Start: 2025-03-25 | End: 2025-03-25

## 2025-03-25 NOTE — ED INITIAL ASSESSMENT (HPI)
Pt states lower left abd pain radiating to back starting Friday.  No changes of uriinary or bowel patterns.  Pt denies fevers.no fevers.  97.6

## 2025-03-25 NOTE — ED PROVIDER NOTES
Patient Seen in: [facility] Emergency Department      History     Chief Complaint   Patient presents with    Abdomen/Flank Pain    Back Pain     Lower abdominal/back pain, feels like a ruptured cyst     Stated Complaint:     Subjective:   HPI      The patient is a 45-year-old female presented to the emergency room with left-sided abdominal pain.  Symptoms started on Friday.  Pain was intermittent and radiating towards her left flank but has become more constant. she initially thought it was because she needed to have a bowel movement.  She states she had a bowel movement and the pain got worse.  She is on Linzess because of IBS and she has been on this for about a year.  She denies any urinary symptoms.  She has no nausea or vomiting associated or fevers or chills.  She has a history of a partial hysterectomy and still has her ovaries and her cervix.    Objective:     Past Medical History:    Abdominal pain    Asthma (HCC)    Back pain    Belching    Bloating    Blood in the stool    Change in hair    Constipation    Decorative tattoo    Extrinsic asthma, unspecified    Fatigue    Flatulence/gas pain/belching    Frequent use of laxatives    Hemorrhoids    Irregular bowel habits    Nausea    Night sweats    Pain in joints    Stress    Wears glasses    Weight gain    Wheezing              Past Surgical History:   Procedure Laterality Date    Colonoscopy      Hysterectomy  1999    partial hystero.    Supracerv abd hysterectomy                  Social History     Socioeconomic History    Marital status:    Tobacco Use    Smoking status: Never     Passive exposure: Never    Smokeless tobacco: Never   Vaping Use    Vaping status: Never Used   Substance and Sexual Activity    Alcohol use: Not Currently     Alcohol/week: 1.0 standard drink of alcohol     Types: 1 Glasses of wine per week     Comment: \"occasionally\"    Drug use: Never    Sexual activity: Yes     Partners: Male     Social Drivers of Health      Food Insecurity: No Food Insecurity (3/6/2025)    Received from [facility]    Food Insecurity     Currently or in the past 3 months, have you worried your food would run out before you had money to buy more?: No     In the past 12 months, have you run out of food or been unable to get more?: No   Transportation Needs: No Transportation Needs (3/6/2025)    Received from [facility]    Transportation Needs     Currently or in the past 3 months, has lack of transportation kept you from medical appointments, getting food or medicine, or providing care to a family member?: Unrecognized value     Medical Transportation Needs?: No    Received from [facility], [facility]    Housing Stability                  Physical Exam     ED Triage Vitals [03/25/25 0918]   /64   Pulse 75   Resp 16   Temp 97.9 °F (36.6 °C)   Temp src Temporal   SpO2 97 %   O2 Device None (Room air)       Current Vitals:   Vital Signs  BP: 120/75  Pulse: 63  Resp: 16  Temp: 97.9 °F (36.6 °C)  Temp src: Temporal  MAP (mmHg): 90    Oxygen Therapy  SpO2: 100 %  O2 Device: None (Room air)        Physical Exam  Vitals and nursing note reviewed.   Constitutional:       General: She is not in acute distress.     Appearance: Normal appearance.   HENT:      Head: Normocephalic.      Nose: Nose normal.      Mouth/Throat:      Mouth: Mucous membranes are moist.   Eyes:      Extraocular Movements: Extraocular movements intact.      Pupils: Pupils are equal, round, and reactive to light.   Cardiovascular:      Rate and Rhythm: Normal rate and regular rhythm.      Pulses: Normal pulses.   Pulmonary:      Effort: Pulmonary effort is normal.   Abdominal:      General: Abdomen is flat. Bowel sounds are normal. There is no distension.      Palpations: Abdomen is soft.      Tenderness: There is abdominal tenderness in the left lower quadrant. There is no right CVA tenderness, left CVA  tenderness, guarding or rebound.      Hernia: No hernia is present.   Musculoskeletal:         General: No swelling or tenderness. Normal range of motion.      Cervical back: Normal range of motion.   Skin:     General: Skin is warm and dry.   Neurological:      Mental Status: She is alert and oriented to person, place, and time. Mental status is at baseline.   Psychiatric:         Mood and Affect: Mood normal.             ED Course     Labs Reviewed   COMP METABOLIC PANEL (14) - Abnormal; Notable for the following components:       Result Value    Glucose 101 (*)     ALT <7 (*)     All other components within normal limits   CBC WITH DIFFERENTIAL WITH PLATELET - Abnormal; Notable for the following components:    HCT 34.4 (*)     All other components within normal limits   LIPASE - Normal   URINALYSIS WITH CULTURE REFLEX   RAINBOW DRAW LAVENDER   RAINBOW DRAW LIGHT GREEN            CT ABDOMEN+PELVIS(CPT=74176)    Result Date: 3/25/2025  CONCLUSION:   1. Findings concerning for cystitis.  Clinical correlation recommended.  2. Trace nonspecific free fluid in the left upper abdomen along with minimal free fluid in the pelvis.  Please see above for further details.  LOCATION:  [facility]   Dictated by (CST): Hemal Araujo MD on 3/25/2025 at 10:38 AM     Finalized by (CST): Hemal Araujo MD on 3/25/2025 at 10:43 AM             MDM              Medical Decision Making    The differential includes the following  Diverticulitis, renal colic, ruptured cyst, intra-abdominal mass, constipation    Pertinent comorbidities include  As detailed above    Pertinent social history includes  As detailed above    Labs  White blood cell count 6.4, hemoglobin 12.6,  CMP unremarkable    Imaging studies  I reviewed the images and my independent interpreation after review is stool burden. Additionaly, I reviewd the radiology report that states the following trace of free fluid in the left lower upper quadrant    External data  reviewed    Discussion of management with external providers    ER course  The patient is afebrile hemodynamically stable here and nontoxic-appearing.  She does have some tenderness without rebound or guarding on her left lower abdomen.  She will be given Toradol and we will obtain labs and a CT scan to further rule out intra-abdominal pathology.    Patient CT scan without acute surgical intra-abdominal pathology or signs of diverticulitis.  Recommended follow-up with PCP and GI.  Return for any worsening symptoms.  Disposition and Plan     Clinical Impression:  1. Abdominal pain of unknown etiology         Disposition:  Discharge  3/25/2025 11:58 am    Follow-up:  Jace Soni MD  [address] [address]  [phone]    Follow up      Idalia Hebert DO  [address] [place] [id number]  [phone]    Follow up      Jace Soni MD  [address] [address]  [phone]                Medications Prescribed:  Discharge Medication List as of 3/25/2025 12:09 PM              Supplementary Documentation:

## 2025-07-08 ENCOUNTER — HOSPITAL ENCOUNTER (EMERGENCY)
Facility: HOSPITAL | Age: 45
Discharge: HOME | End: 2025-07-09
Attending: EMERGENCY MEDICINE
Payer: COMMERCIAL

## 2025-07-08 DIAGNOSIS — N83.202 LEFT OVARIAN CYST: Primary | ICD-10-CM

## 2025-07-08 LAB
ALBUMIN SERPL-MCNC: 4.2 G/DL (ref 3.2–4.8)
ALBUMIN/GLOB SERPL: 1.5 {RATIO} (ref 1–2)
ALP LIVER SERPL-CCNC: 58 U/L (ref 39–100)
ALT SERPL-CCNC: <7 U/L (ref 10–49)
ANION GAP SERPL CALC-SCNC: 4 MMOL/L (ref 0–18)
AST SERPL-CCNC: 13 U/L (ref ?–34)
B-HCG SERPL-ACNC: <2.6 MIU/ML (ref ?–4.2)
BASOPHILS # BLD AUTO: 0.03 X10(3) UL (ref 0–0.2)
BASOPHILS NFR BLD AUTO: 0.4 %
BILIRUB SERPL-MCNC: 0.3 MG/DL (ref 0.3–1.2)
BUN BLD-MCNC: 12 MG/DL (ref 9–23)
CALCIUM BLD-MCNC: 9.6 MG/DL (ref 8.7–10.6)
CHLORIDE SERPL-SCNC: 107 MMOL/L (ref 98–112)
CO2 SERPL-SCNC: 30 MMOL/L (ref 21–32)
CREAT BLD-MCNC: 0.8 MG/DL (ref 0.55–1.02)
EGFRCR SERPLBLD CKD-EPI 2021: 92 ML/MIN/1.73M2 (ref 60–?)
EOSINOPHIL # BLD AUTO: 0.17 X10(3) UL (ref 0–0.7)
EOSINOPHIL NFR BLD AUTO: 2.1 %
ERYTHROCYTE [DISTWIDTH] IN BLOOD BY AUTOMATED COUNT: 13.1 %
GLOBULIN PLAS-MCNC: 2.8 G/DL (ref 2–3.5)
GLUCOSE BLD-MCNC: 97 MG/DL (ref 70–99)
HCT VFR BLD AUTO: 32.8 % (ref 35–48)
HGB BLD-MCNC: 12 G/DL (ref 12–16)
IMM GRANULOCYTES # BLD AUTO: 0.01 X10(3) UL (ref 0–1)
IMM GRANULOCYTES NFR BLD: 0.1 %
LIPASE SERPL-CCNC: 65 U/L (ref 12–53)
LYMPHOCYTES # BLD AUTO: 3.18 X10(3) UL (ref 1–4)
LYMPHOCYTES NFR BLD AUTO: 40.1 %
MCH RBC QN AUTO: 30.2 PG (ref 26–34)
MCHC RBC AUTO-ENTMCNC: 36.6 G/DL (ref 31–37)
MCV RBC AUTO: 82.6 FL (ref 80–100)
MONOCYTES # BLD AUTO: 0.67 X10(3) UL (ref 0.1–1)
MONOCYTES NFR BLD AUTO: 8.4 %
NEUTROPHILS # BLD AUTO: 3.87 X10 (3) UL (ref 1.5–7.7)
NEUTROPHILS # BLD AUTO: 3.87 X10(3) UL (ref 1.5–7.7)
NEUTROPHILS NFR BLD AUTO: 48.9 %
OSMOLALITY SERPL CALC.SUM OF ELEC: 292 MOSM/KG (ref 275–295)
PLATELET # BLD AUTO: 264 10(3)UL (ref 150–450)
POTASSIUM SERPL-SCNC: 4 MMOL/L (ref 3.5–5.1)
PROT SERPL-MCNC: 7 G/DL (ref 5.7–8.2)
RBC # BLD AUTO: 3.97 X10(6)UL (ref 3.8–5.3)
SODIUM SERPL-SCNC: 141 MMOL/L (ref 136–145)
WBC # BLD AUTO: 7.9 X10(3) UL (ref 4–11)

## 2025-07-08 PROCEDURE — 99285 EMERGENCY DEPT VISIT HI MDM: CPT

## 2025-07-08 PROCEDURE — 85025 COMPLETE CBC W/AUTO DIFF WBC: CPT | Performed by: EMERGENCY MEDICINE

## 2025-07-08 PROCEDURE — 96361 HYDRATE IV INFUSION ADD-ON: CPT

## 2025-07-08 PROCEDURE — 80053 COMPREHEN METABOLIC PANEL: CPT | Performed by: EMERGENCY MEDICINE

## 2025-07-08 PROCEDURE — 96375 TX/PRO/DX INJ NEW DRUG ADDON: CPT

## 2025-07-08 PROCEDURE — 83690 ASSAY OF LIPASE: CPT | Performed by: EMERGENCY MEDICINE

## 2025-07-08 PROCEDURE — 84702 CHORIONIC GONADOTROPIN TEST: CPT | Performed by: EMERGENCY MEDICINE

## 2025-07-08 RX ORDER — KETOROLAC TROMETHAMINE 15 MG/ML
15 INJECTION, SOLUTION INTRAMUSCULAR; INTRAVENOUS ONCE
Status: COMPLETED | OUTPATIENT
Start: 2025-07-08 | End: 2025-07-08

## 2025-07-08 RX ORDER — ONDANSETRON 2 MG/ML
4 INJECTION INTRAMUSCULAR; INTRAVENOUS ONCE
Status: COMPLETED | OUTPATIENT
Start: 2025-07-08 | End: 2025-07-08

## 2025-07-08 RX ORDER — MORPHINE SULFATE 4 MG/ML
4 INJECTION, SOLUTION INTRAMUSCULAR; INTRAVENOUS EVERY 30 MIN PRN
Status: DISCONTINUED | OUTPATIENT
Start: 2025-07-08 | End: 2025-07-09

## 2025-07-08 RX ORDER — SODIUM CHLORIDE 9 MG/ML
125 INJECTION, SOLUTION INTRAVENOUS CONTINUOUS
Status: DISCONTINUED | OUTPATIENT
Start: 2025-07-08 | End: 2025-07-09

## 2025-07-09 ENCOUNTER — APPOINTMENT (OUTPATIENT)
Dept: ULTRASOUND IMAGING | Facility: HOSPITAL | Age: 45
End: 2025-07-09
Attending: EMERGENCY MEDICINE
Payer: COMMERCIAL

## 2025-07-09 ENCOUNTER — APPOINTMENT (OUTPATIENT)
Dept: CT IMAGING | Facility: HOSPITAL | Age: 45
End: 2025-07-09
Attending: EMERGENCY MEDICINE
Payer: COMMERCIAL

## 2025-07-09 VITALS
OXYGEN SATURATION: 99 % | SYSTOLIC BLOOD PRESSURE: 128 MMHG | WEIGHT: 173 LBS | HEART RATE: 78 BPM | TEMPERATURE: 98 F | DIASTOLIC BLOOD PRESSURE: 77 MMHG | BODY MASS INDEX: 30 KG/M2 | RESPIRATION RATE: 16 BRPM

## 2025-07-09 LAB
B-HCG UR QL: NEGATIVE
BILIRUB UR QL STRIP.AUTO: NEGATIVE
CLARITY UR REFRACT.AUTO: CLEAR
GLUCOSE UR STRIP.AUTO-MCNC: NORMAL MG/DL
KETONES UR STRIP.AUTO-MCNC: NEGATIVE MG/DL
LEUKOCYTE ESTERASE UR QL STRIP.AUTO: 500
PH UR STRIP.AUTO: 6.5 [PH] (ref 5–8)
PROT UR STRIP.AUTO-MCNC: NEGATIVE MG/DL
RBC UR QL AUTO: NEGATIVE
SP GR UR STRIP.AUTO: 1.02 (ref 1–1.03)
UROBILINOGEN UR STRIP.AUTO-MCNC: NORMAL MG/DL

## 2025-07-09 PROCEDURE — 76856 US EXAM PELVIC COMPLETE: CPT | Performed by: EMERGENCY MEDICINE

## 2025-07-09 PROCEDURE — 96365 THER/PROPH/DIAG IV INF INIT: CPT

## 2025-07-09 PROCEDURE — 87086 URINE CULTURE/COLONY COUNT: CPT | Performed by: EMERGENCY MEDICINE

## 2025-07-09 PROCEDURE — 87077 CULTURE AEROBIC IDENTIFY: CPT | Performed by: EMERGENCY MEDICINE

## 2025-07-09 PROCEDURE — 81001 URINALYSIS AUTO W/SCOPE: CPT | Performed by: EMERGENCY MEDICINE

## 2025-07-09 PROCEDURE — 93975 VASCULAR STUDY: CPT | Performed by: EMERGENCY MEDICINE

## 2025-07-09 PROCEDURE — 81025 URINE PREGNANCY TEST: CPT

## 2025-07-09 PROCEDURE — 74176 CT ABD & PELVIS W/O CONTRAST: CPT | Performed by: EMERGENCY MEDICINE

## 2025-07-09 RX ORDER — ACETAMINOPHEN AND CODEINE PHOSPHATE 300; 30 MG/1; MG/1
1-2 TABLET ORAL EVERY 6 HOURS PRN
Qty: 10 TABLET | Refills: 0 | Status: SHIPPED | OUTPATIENT
Start: 2025-07-09 | End: 2025-07-14

## 2025-07-09 RX ORDER — CEPHALEXIN 500 MG/1
500 CAPSULE ORAL 3 TIMES DAILY
Qty: 21 CAPSULE | Refills: 0 | Status: SHIPPED | OUTPATIENT
Start: 2025-07-09 | End: 2025-07-16

## 2025-07-09 NOTE — ED PROVIDER NOTES
Patient Seen in: [facility] Emergency Department        History  Chief Complaint   Patient presents with   • Back Pain     Stated Complaint: Left sided back pain    Subjective:   HPI            45-year-old female present with back pain abdominal pain has been progressively worsening over the last 2 weeks starting in the left lower back radiates into the left hip and now is also in the left lower quadrant of her abdomen no fevers chills no urinary complaints no bowel habit changes no other exacerbating factors or associated symptoms.      Objective:     Past Medical History:   • Abdominal pain   • Asthma (HCC)   • Back pain   • Belching   • Bloating   • Blood in the stool   • Change in hair   • Constipation   • Decorative tattoo   • Extrinsic asthma, unspecified   • Fatigue   • Flatulence/gas pain/belching   • Frequent use of laxatives   • Hemorrhoids   • Irregular bowel habits   • Nausea   • Night sweats   • Pain in joints   • Stress   • Wears glasses   • Weight gain   • Wheezing              Past Surgical History:   Procedure Laterality Date   • Colonoscopy     • Hysterectomy  1999    partial hystero.   • Supracerv abd hysterectomy                  Social History     Socioeconomic History   • Marital status:    Tobacco Use   • Smoking status: Never     Passive exposure: Never   • Smokeless tobacco: Never   Vaping Use   • Vaping status: Never Used   Substance and Sexual Activity   • Alcohol use: Not Currently     Alcohol/week: 1.0 standard drink of alcohol     Types: 1 Glasses of wine per week     Comment: \"occasionally\"   • Drug use: Never   • Sexual activity: Yes     Partners: Male     Social Drivers of Health     Food Insecurity: No Food Insecurity (3/6/2025)    Received from [facility]    Food Insecurity    • Currently or in the past 3 months, have you worried your food would run out before you had money to buy more?: No    • In the past 12 months, have you run out of food or  been unable to get more?: No   Transportation Needs: No Transportation Needs (3/6/2025)    Received from [facility]    Transportation Needs    • Currently or in the past 3 months, has lack of transportation kept you from medical appointments, getting food or medicine, or providing care to a family member?: No    Received from [facility]    Housing Stability                                Physical Exam    ED Triage Vitals [07/08/25 1926]   /80   Pulse 80   Resp 18   Temp 98.1 °F (36.7 °C)   Temp src Oral   SpO2 97 %   O2 Device None (Room air)       Current Vitals:   Vital Signs  BP: 128/77  Pulse: 78  Resp: 16  Temp: 98.1 °F (36.7 °C)  Temp src: Oral    Oxygen Therapy  SpO2: 99 %  O2 Device: None (Room air)            Physical Exam     Awake alert patient appears no distress HEENT exam is normal lungs are clear cardiovascular exam regular rhythm abdomen soft nontender extremities no COVID cyanosis or edema no rash back exam is normal skin is nondiaphoretic       ED Course  Labs Reviewed   COMP METABOLIC PANEL (14) - Abnormal; Notable for the following components:       Result Value    ALT <7 (*)     All other components within normal limits   CBC WITH DIFFERENTIAL WITH PLATELET - Abnormal; Notable for the following components:    HCT 32.8 (*)     All other components within normal limits   URINALYSIS WITH CULTURE REFLEX - Abnormal; Notable for the following components:    Nitrite Urine 2+ (*)     Leukocyte Esterase Urine 500 (*)     Bacteria Urine Rare (*)     Squamous Epi. Cells Few (*)     All other components within normal limits   LIPASE - Abnormal; Notable for the following components:    Lipase 65 (*)     All other components within normal limits   HCG, BETA SUBUNIT (QUANT PREGNANCY TEST) - Normal   POCT PREGNANCY URINE - Normal   URINE CULTURE, ROUTINE       ED Course as of 07/09/25 0200  ------------------------------------------------------------  Time: 07/08  2336  Value: Lipase(!)  Comment: Elevated  ------------------------------------------------------------  Time: 07/08 2336  Value: CBC With Differential With Platelet(!)  Comment: Unremarkable  ------------------------------------------------------------  Time: 07/08 2336  Value: Comp Metabolic Panel (14)(!)  Comment: Unremarkable  ------------------------------------------------------------  Time: 07/08 2356  Value: HCG, Beta Subunit (Quant Pregnancy Test)  Comment: Negative  ------------------------------------------------------------  Time: 07/09 0054  Value: CT ABDOMEN+PELVIS(CPT=74176)  Comment: Independent interpretation by ED physician of CT scan shows enlarging 4.1 cm left ovarian complex left ovarian cystic lesion     Differential diagnosis includes sepsis, diverticulitis                  MDM             Medical Decision Making  45-year-old female presenting with back pain and left lower quadrant pain IV established cardiac monitor shows a sinus rhythm pulse ox shows no signs of hypoxia urinalysis indicative of urinary tract infection antibodies given.  Laboratory results within acceptable is independent trepidation by ED physician of CT scan shows a left ovarian cyst.  Interpretation by ED physician of ultrasound shows no torsion patient will be given dose of antibiotics for the urine possible ear infection patient will be discharged home with follow-up with her gynecologist and is to return to the emergency department for worsening symptoms of complaints.    Problems Addressed:  Left ovarian cyst: acute illness or injury    Amount and/or Complexity of Data Reviewed  Labs: ordered. Decision-making details documented in ED Course.  Radiology: ordered and independent interpretation performed. Decision-making details documented in ED Course.  ECG/medicine tests: ordered and independent interpretation performed. Decision-making details documented in ED Course.        Disposition and Plan     Clinical  Impression:  1. Left ovarian cyst         Disposition:  Discharge  7/9/2025  2:00 am    Follow-up:  Adeline Thompson MD  [address] [place] [id number]  [phone]    Follow up in 1 week(s)            Medications Prescribed:  Current Discharge Medication List        START taking these medications    Details   cephALEXin 500 MG Oral Cap Take 1 capsule (500 mg total) by mouth 3 (three) times daily for 7 days.  Qty: 21 capsule, Refills: 0                   Supplementary Documentation:

## 2025-07-09 NOTE — ED INITIAL ASSESSMENT (HPI)
Patient arrives with complaint of left lower back pain that she states wraps around to her lower left abdomen. Pain started about 2 weeks ago and pain has been progressively getting worse. Pt states it feels like muscle pain that is sharp. Reportedly nauseous. Denies urinary symptoms.

## 2025-07-17 ENCOUNTER — OFFICE VISIT (OUTPATIENT)
Dept: OBGYN CLINIC | Facility: CLINIC | Age: 45
End: 2025-07-17

## 2025-07-17 VITALS
WEIGHT: 172 LBS | BODY MASS INDEX: 29.37 KG/M2 | SYSTOLIC BLOOD PRESSURE: 147 MMHG | HEART RATE: 97 BPM | HEIGHT: 64 IN | DIASTOLIC BLOOD PRESSURE: 95 MMHG

## 2025-07-17 DIAGNOSIS — N83.202 LEFT OVARIAN CYST: Primary | ICD-10-CM

## 2025-07-17 PROCEDURE — 99213 OFFICE O/P EST LOW 20 MIN: CPT | Performed by: OBSTETRICS & GYNECOLOGY

## 2025-07-17 RX ORDER — HYDROCODONE BITARTRATE AND ACETAMINOPHEN 5; 325 MG/1; MG/1
1 TABLET ORAL EVERY 6 HOURS PRN
Qty: 20 TABLET | Refills: 0 | Status: SHIPPED | OUTPATIENT
Start: 2025-07-17

## 2025-07-17 RX ORDER — ETONOGESTREL AND ETHINYL ESTRADIOL VAGINAL RING .015; .12 MG/D; MG/D
1 RING VAGINAL
Qty: 3 RING | Refills: 4 | Status: SHIPPED | OUTPATIENT
Start: 2025-07-17

## 2025-07-17 NOTE — PROGRESS NOTES
Chief Complaint   Patient presents with    Pelvic Pain         Meg N Daniel Helm is a 45 year old female who presents for pelvic pain in the lower left side of pelvic area. Patient recently visited the ER on 2025.  .    LMP: partal hysterctomy.    Menses regular: n/a.    Menstrual flow normal: n/a.    Birth control or HRT: None.   Refill n/a  Last Pap Smear: 2022 . Any history of abnormal paps: Yes, per patient prior to , HPV+, had LEEP done.  Gardasil:(age 8-46 y/o) No.   Any medication refills needed today?: None    Problems/concerns: Patient reports still feeling pelvic pain after ER visit on 2025.      Next Appt: Follow up        Immunization History   Administered Date(s) Administered    FLUZONE 6 months and older PFS 0.5 ml (08633) 2017, 2018, 2021, 10/31/2022, 2024    TDAP 2018       Medications - Current[1]    Allergies[2]    OB History    Para Term  AB Living   1 1 0 0 0 1   SAB IAB Ectopic Multiple Live Births   0 0 0 0 0      # Outcome Date GA Lbr Rc/2nd Weight Sex Type Anes PTL Lv   1 Para 89   9 lb 11 oz (4.394 kg) M CS-Unspec          Gyn History      No data recorded       No data to display                    Past Medical History[3]    Past Surgical History[4]    Family History[5]     Tobacco  Allergies  Meds  Problems  Med Hx  Surg Hx  Fam Hx         Social History     Socioeconomic History    Marital status:      Spouse name: Not on file    Number of children: Not on file    Years of education: Not on file    Highest education level: Not on file   Occupational History    Not on file   Tobacco Use    Smoking status: Never     Passive exposure: Never    Smokeless tobacco: Never   Vaping Use    Vaping status: Never Used   Substance and Sexual Activity    Alcohol use: Not Currently     Alcohol/week: 1.0 standard drink of alcohol     Types: 1 Glasses of wine per week     Comment: \"occasionally\"    Drug use:  Never    Sexual activity: Yes     Partners: Male   Other Topics Concern    Not on file   Social History Narrative    Not on file     Social Drivers of Health     Food Insecurity: No Food Insecurity (3/6/2025)    Received from [facility]    Food Insecurity     Currently or in the past 3 months, have you worried your food would run out before you had money to buy more?: No     In the past 12 months, have you run out of food or been unable to get more?: No   Transportation Needs: No Transportation Needs (3/6/2025)    Received from [facility]    Transportation Needs     Currently or in the past 3 months, has lack of transportation kept you from medical appointments, getting food or medicine, or providing care to a family member?: No     Non-Medical Transportation Needs?: Not on file   Stress: Not on file   Housing Stability: Low Risk  (7/8/2021)    Received from [facility]    Housing Stability     Mortgage Payment Concerns?: Not on file     Number of Places Lived in the Last Year: Not on file     Unstable Housing?: Not on file        Ht 5' 4\" (1.626 m)   Wt 172 lb (78 kg)     Wt Readings from Last 3 Encounters:   07/17/25 172 lb (78 kg)   07/08/25 173 lb (78.5 kg)   03/25/25 176 lb (79.8 kg)       Health Maintenance   Topic Date Due    Annual Physical  Never done    Asthma Control Test  Never done    Pneumococcal Vaccine: Birth to 50yrs (1 of 2 - PCV) Never done    COVID-19 Vaccine (1 - 2024-25 season) Never done    Annual Depression Screening  01/01/2025    Pap Smear  01/14/2025    Mammogram  03/25/2025    Influenza Vaccine (1) 10/01/2025    Colorectal Cancer Screening  03/14/2027    DTaP,Tdap,and Td Vaccines (2 - Td or Tdap) 12/03/2028    Meningococcal B Vaccine  Aged Out     FINDINGS:    UTERUS: Surgically absent    OVARIES AND ADNEXA:  RIGHT: The right ovary measures 3.1 x 1.1 x 1.5 cm. Normal appearance with no masses.  LEFT: The left ovary measures 6.4 x  3.9 x 5 point cm. 4.1 cm simple left ovarian cyst consistent with benign functional cyst.    DOPPLER: Normal spectral analysis, arterial and venous waveforms and color flow in both ovaries.    CUL-DE-SAC: Normal. No free fluid or mass.  OTHER: Negative. Bladder appears normal.      Impression   CONCLUSION: 4.1 cm simple left ovarian cyst. No acute pelvic pathology. No evidence of ovarian torsion.          Review of Systems   General: Present- Feeling well. Not Present- Fever.  Female Genitourinary: Not Present- Dysmenorrhea, Dyspareunia, Flank Pain, Frequency, Menstrual Irregularities, Pelvic Pain, Urgency, Urinary Complaints, Vaginal Bleeding and Vaginal dryness.  Pain: Present- Pain Rating - 0 on a 0-10 scale.  All other systems negative       Physical Exam   The physical exam findings are as follows:     Abdomen   Inspection: - Inspection Normal.  Palpation/Percussion: Palpation and Percussion of the abdomen reveal - Non Tender, No hepatosplenomegaly and No Palpable abdominal masses.    Female Genitourinary     External Genitalia   Perineum - Normal. Bartholin's Gland - Bilateral - Normal. Clitoris - Normal.  Introitus: Characteristics - Normal. Discharge - None.  Labia Majora: Lesions - Bilateral - None. Characteristics - Bilateral - Normal.  Labia Minora: Lesions - Bilateral - None. Characteristics - Bilateral - Normal.  Urethra: Characteristics - Normal. Discharge - None.  [place] Gland - Bilateral - Normal.  Vulva: Characteristics - Normal. Lesions - None.    Speculum & Bimanual   Vagina:   Vaginal Wall: - Normal.  Vaginal Lesions - None. Vaginal Mucosa - Normal.  Cervix: Characteristics - No Motion tenderness. Discharge - None.  Uterus: Characteristics - Non Tender. Position - Midposition.  Adnexa: Characteristics - Bilateral - Tender. Masses - No Adnexal Masses.  Bladder - Normal.      Rectal   Anorectal Exam: External - normal external exam.    Lymphatic  General Lymphatics   Description - Normal .    Plan  Repeat ultrasound in 6 weeks, alt nsaids and Norco for pain, start nuva ring, and toradol today     1. Left ovarian cyst                         [1]   Current Outpatient Medications:     HYDROcodone-acetaminophen 5-325 MG Oral Tab, Take 1 tablet by mouth every 6 (six) hours as needed for Pain., Disp: 20 tablet, Rfl: 0    Etonogestrel-Ethinyl Estradiol (NUVARING) 0.12-0.015 MG/24HR Vaginal Ring, Place 1 Ring vaginally every 30 (thirty) days., Disp: 3 Ring, Rfl: 4    gabapentin 600 MG Oral Tab, Take 1 tablet (600 mg total) by mouth in the morning and 1 tablet (600 mg total) in the evening and 1 tablet (600 mg total) before bedtime., Disp: , Rfl:     linaCLOtide (LINZESS OR), Take by mouth., Disp: , Rfl:     Alclometasone Dipropionate 0.05 % External Ointment, , Disp: , Rfl:     BREO ELLIPTA 100-25 MCG/INH Inhalation Aerosol Powder, Breath Activated, Inhale 1 puff into the lungs in the morning., Disp: , Rfl:     montelukast 10 MG Oral Tab, Take 1 tablet (10 mg total) by mouth nightly., Disp: , Rfl:     Albuterol Sulfate HFA (VENTOLIN) 108 (90 BASE) MCG/ACT Inhalation Aero Soln, Inhale into the lungs every 6 (six) hours as needed for Wheezing., Disp: , Rfl:     Ketorolac Tromethamine 10 MG Oral Tab, Take 1 tablet (10 mg total) by mouth every 6 (six) hours as needed for Pain. (Patient not taking: Reported on 7/17/2025), Disp: 20 tablet, Rfl: 1  [2]   Allergies  Allergen Reactions    Iodine (Topical) HIVES    Oxycodone-Acetaminophen HIVES and RASH    Radiology Contrast Iodinated Dyes HIVES    Linzess [Linaclotide] RASH    Sulfamethoxazole W/Trimethoprim RASH     Most likely cause of rash.  Also was taking macrobid, flagyl, diflucan.     Most likely cause of rash.  Also was taking macrobid, flagyl, diflucan.   [3]   Past Medical History:   Abdominal pain    Asthma (HCC)    Back pain    Belching    Bloating    Blood in the stool    Change in hair    Constipation    Decorative tattoo    Extrinsic asthma, unspecified     Fatigue    Flatulence/gas pain/belching    Frequent use of laxatives    Hemorrhoids    Irregular bowel habits    Nausea    Night sweats    Pain in joints    Stress    Wears glasses    Weight gain    Wheezing   [4]   Past Surgical History:  Procedure Laterality Date    Colonoscopy      Hysterectomy      partial hystero.    Supracerv abd hysterectomy     [5] History reviewed. No pertinent family history.

## 2025-08-01 ENCOUNTER — APPOINTMENT (OUTPATIENT)
Dept: ULTRASOUND IMAGING | Facility: HOSPITAL | Age: 45
End: 2025-08-01
Attending: EMERGENCY MEDICINE

## 2025-08-01 ENCOUNTER — HOSPITAL ENCOUNTER (EMERGENCY)
Facility: HOSPITAL | Age: 45
Discharge: HOME | End: 2025-08-01
Attending: EMERGENCY MEDICINE

## 2025-08-01 VITALS
BODY MASS INDEX: 29.02 KG/M2 | RESPIRATION RATE: 18 BRPM | DIASTOLIC BLOOD PRESSURE: 73 MMHG | HEIGHT: 64 IN | SYSTOLIC BLOOD PRESSURE: 128 MMHG | WEIGHT: 170 LBS | HEART RATE: 67 BPM | OXYGEN SATURATION: 100 % | TEMPERATURE: 98 F

## 2025-08-01 DIAGNOSIS — N83.202 CYST OF LEFT OVARY: Primary | ICD-10-CM

## 2025-08-01 LAB
ALBUMIN SERPL-MCNC: 4.4 G/DL (ref 3.2–4.8)
ALBUMIN/GLOB SERPL: 1.5 (ref 1–2)
ALP LIVER SERPL-CCNC: 61 U/L (ref 39–100)
ALT SERPL-CCNC: <7 U/L (ref 10–49)
ANION GAP SERPL CALC-SCNC: 10 MMOL/L (ref 0–18)
AST SERPL-CCNC: 17 U/L (ref ?–34)
B-HCG UR QL: NEGATIVE
BASOPHILS # BLD AUTO: 0.02 X10(3) UL (ref 0–0.2)
BASOPHILS NFR BLD AUTO: 0.3 %
BILIRUB SERPL-MCNC: 0.4 MG/DL (ref 0.3–1.2)
BILIRUB UR QL STRIP.AUTO: NEGATIVE
BUN BLD-MCNC: 8 MG/DL (ref 9–23)
CALCIUM BLD-MCNC: 9.6 MG/DL (ref 8.7–10.6)
CHLORIDE SERPL-SCNC: 104 MMOL/L (ref 98–112)
CLARITY UR REFRACT.AUTO: CLEAR
CO2 SERPL-SCNC: 27 MMOL/L (ref 21–32)
COLOR UR AUTO: YELLOW
CREAT BLD-MCNC: 0.84 MG/DL (ref 0.55–1.02)
EGFRCR SERPLBLD CKD-EPI 2021: 87 ML/MIN/1.73M2 (ref 60–?)
EOSINOPHIL # BLD AUTO: 0.06 X10(3) UL (ref 0–0.7)
EOSINOPHIL NFR BLD AUTO: 0.8 %
ERYTHROCYTE [DISTWIDTH] IN BLOOD BY AUTOMATED COUNT: 12.7 %
GLOBULIN PLAS-MCNC: 3 G/DL (ref 2–3.5)
GLUCOSE BLD-MCNC: 99 MG/DL (ref 70–99)
GLUCOSE UR STRIP.AUTO-MCNC: NORMAL MG/DL
HCT VFR BLD AUTO: 33.5 % (ref 35–48)
HGB BLD-MCNC: 12.5 G/DL (ref 12–16)
IMM GRANULOCYTES # BLD AUTO: 0.01 X10(3) UL (ref 0–1)
IMM GRANULOCYTES NFR BLD: 0.1 %
LEUKOCYTE ESTERASE UR QL STRIP.AUTO: NEGATIVE
LYMPHOCYTES # BLD AUTO: 2.7 X10(3) UL (ref 1–4)
LYMPHOCYTES NFR BLD AUTO: 38 %
MCH RBC QN AUTO: 30.1 PG (ref 26–34)
MCHC RBC AUTO-ENTMCNC: 37.3 G/DL (ref 31–37)
MCV RBC AUTO: 80.7 FL (ref 80–100)
MONOCYTES # BLD AUTO: 0.5 X10(3) UL (ref 0.1–1)
MONOCYTES NFR BLD AUTO: 7 %
NEUTROPHILS # BLD AUTO: 3.82 X10 (3) UL (ref 1.5–7.7)
NEUTROPHILS # BLD AUTO: 3.82 X10(3) UL (ref 1.5–7.7)
NEUTROPHILS NFR BLD AUTO: 53.8 %
NITRITE UR QL STRIP.AUTO: NEGATIVE
OSMOLALITY SERPL CALC.SUM OF ELEC: 290 MOSM/KG (ref 275–295)
PH UR STRIP.AUTO: 6 (ref 5–8)
PLATELET # BLD AUTO: 292 10(3)UL (ref 150–450)
POTASSIUM SERPL-SCNC: 3.6 MMOL/L (ref 3.5–5.1)
PROT SERPL-MCNC: 7.4 G/DL (ref 5.7–8.2)
RBC # BLD AUTO: 4.15 X10(6)UL (ref 3.8–5.3)
RBC UR QL AUTO: NEGATIVE
SODIUM SERPL-SCNC: 141 MMOL/L (ref 136–145)
SP GR UR STRIP.AUTO: >1.03 (ref 1–1.03)
UROBILINOGEN UR STRIP.AUTO-MCNC: NORMAL MG/DL
WBC # BLD AUTO: 7.1 X10(3) UL (ref 4–11)

## 2025-08-01 PROCEDURE — 76856 US EXAM PELVIC COMPLETE: CPT | Performed by: EMERGENCY MEDICINE

## 2025-08-01 PROCEDURE — 99285 EMERGENCY DEPT VISIT HI MDM: CPT

## 2025-08-01 PROCEDURE — 76830 TRANSVAGINAL US NON-OB: CPT | Performed by: EMERGENCY MEDICINE

## 2025-08-01 PROCEDURE — 81003 URINALYSIS AUTO W/O SCOPE: CPT | Performed by: EMERGENCY MEDICINE

## 2025-08-01 PROCEDURE — 93975 VASCULAR STUDY: CPT | Performed by: EMERGENCY MEDICINE

## 2025-08-01 PROCEDURE — 85025 COMPLETE CBC W/AUTO DIFF WBC: CPT | Performed by: EMERGENCY MEDICINE

## 2025-08-01 PROCEDURE — 99284 EMERGENCY DEPT VISIT MOD MDM: CPT

## 2025-08-01 PROCEDURE — 81025 URINE PREGNANCY TEST: CPT

## 2025-08-01 PROCEDURE — 96374 THER/PROPH/DIAG INJ IV PUSH: CPT

## 2025-08-01 PROCEDURE — 80053 COMPREHEN METABOLIC PANEL: CPT | Performed by: EMERGENCY MEDICINE

## 2025-08-01 RX ORDER — KETOROLAC TROMETHAMINE 15 MG/ML
15 INJECTION, SOLUTION INTRAMUSCULAR; INTRAVENOUS ONCE
Status: COMPLETED | OUTPATIENT
Start: 2025-08-01 | End: 2025-08-01

## 2025-08-18 ENCOUNTER — HOSPITAL ENCOUNTER (EMERGENCY)
Facility: HOSPITAL | Age: 45
Discharge: HOME | End: 2025-08-19
Attending: EMERGENCY MEDICINE

## 2025-08-18 VITALS
BODY MASS INDEX: 30 KG/M2 | OXYGEN SATURATION: 100 % | SYSTOLIC BLOOD PRESSURE: 122 MMHG | HEART RATE: 65 BPM | RESPIRATION RATE: 16 BRPM | DIASTOLIC BLOOD PRESSURE: 73 MMHG | WEIGHT: 172 LBS | TEMPERATURE: 99 F

## 2025-08-18 DIAGNOSIS — G43.909 MIGRAINE WITHOUT STATUS MIGRAINOSUS, NOT INTRACTABLE, UNSPECIFIED MIGRAINE TYPE: Primary | ICD-10-CM

## 2025-08-18 PROCEDURE — 99285 EMERGENCY DEPT VISIT HI MDM: CPT

## 2025-08-18 PROCEDURE — 99284 EMERGENCY DEPT VISIT MOD MDM: CPT

## 2025-08-18 PROCEDURE — 96361 HYDRATE IV INFUSION ADD-ON: CPT

## 2025-08-18 PROCEDURE — 96374 THER/PROPH/DIAG INJ IV PUSH: CPT

## 2025-08-18 PROCEDURE — 96375 TX/PRO/DX INJ NEW DRUG ADDON: CPT

## 2025-08-18 RX ORDER — KETOROLAC TROMETHAMINE 15 MG/ML
15 INJECTION, SOLUTION INTRAMUSCULAR; INTRAVENOUS ONCE
Status: COMPLETED | OUTPATIENT
Start: 2025-08-18 | End: 2025-08-18

## 2025-08-18 RX ORDER — DEXAMETHASONE SODIUM PHOSPHATE 4 MG/ML
10 VIAL (ML) INJECTION ONCE
Status: COMPLETED | OUTPATIENT
Start: 2025-08-18 | End: 2025-08-18

## 2025-08-18 RX ORDER — ONDANSETRON 2 MG/ML
4 INJECTION INTRAMUSCULAR; INTRAVENOUS ONCE
Status: COMPLETED | OUTPATIENT
Start: 2025-08-18 | End: 2025-08-18

## 2025-08-18 RX ORDER — MORPHINE SULFATE 4 MG/ML
4 INJECTION, SOLUTION INTRAMUSCULAR; INTRAVENOUS ONCE
Status: COMPLETED | OUTPATIENT
Start: 2025-08-18 | End: 2025-08-18

## 2025-08-18 RX ORDER — CYCLOBENZAPRINE HCL 10 MG
10 TABLET ORAL
COMMUNITY
Start: 2024-06-21

## 2025-08-19 ENCOUNTER — APPOINTMENT (OUTPATIENT)
Dept: CT IMAGING | Facility: HOSPITAL | Age: 45
End: 2025-08-19
Attending: EMERGENCY MEDICINE

## 2025-08-19 PROCEDURE — 70450 CT HEAD/BRAIN W/O DYE: CPT | Performed by: EMERGENCY MEDICINE

## 2025-08-19 RX ORDER — BUTALBITAL, ACETAMINOPHEN AND CAFFEINE 50; 325; 40 MG/1; MG/1; MG/1
1-2 TABLET ORAL EVERY 4 HOURS PRN
Qty: 20 TABLET | Refills: 0 | Status: SHIPPED | OUTPATIENT
Start: 2025-08-19 | End: 2025-08-26

## 2025-08-19 RX ORDER — ONDANSETRON 4 MG/1
4 TABLET, ORALLY DISINTEGRATING ORAL EVERY 4 HOURS PRN
Qty: 10 TABLET | Refills: 0 | Status: SHIPPED | OUTPATIENT
Start: 2025-08-19 | End: 2025-08-26

## 2025-08-28 ENCOUNTER — APPOINTMENT (OUTPATIENT)
Dept: CV DIAGNOSTICS | Facility: HOSPITAL | Age: 45
End: 2025-08-28
Attending: EMERGENCY MEDICINE

## 2025-08-28 ENCOUNTER — APPOINTMENT (OUTPATIENT)
Dept: CT IMAGING | Facility: HOSPITAL | Age: 45
End: 2025-08-28
Attending: EMERGENCY MEDICINE

## 2025-08-28 ENCOUNTER — APPOINTMENT (OUTPATIENT)
Dept: GENERAL RADIOLOGY | Facility: HOSPITAL | Age: 45
End: 2025-08-28
Attending: EMERGENCY MEDICINE

## 2025-08-28 ENCOUNTER — HOSPITAL ENCOUNTER (INPATIENT)
Facility: HOSPITAL | Age: 45
LOS: 1 days | Discharge: HOME | End: 2025-08-29
Attending: EMERGENCY MEDICINE | Admitting: HOSPITALIST

## 2025-08-28 ENCOUNTER — APPOINTMENT (OUTPATIENT)
Dept: ULTRASOUND IMAGING | Facility: HOSPITAL | Age: 45
End: 2025-08-28
Attending: HOSPITALIST

## 2025-08-28 DIAGNOSIS — I26.99 PULMONARY INFARCT (HCC): ICD-10-CM

## 2025-08-28 DIAGNOSIS — I26.99 BILATERAL PULMONARY EMBOLISM (HCC): Primary | ICD-10-CM

## 2025-08-28 DIAGNOSIS — R00.0 SINUS TACHYCARDIA: ICD-10-CM

## 2025-08-28 PROBLEM — R73.9 HYPERGLYCEMIA: Status: ACTIVE | Noted: 2025-08-28

## 2025-08-28 LAB
ALBUMIN SERPL-MCNC: 4.7 G/DL (ref 3.2–4.8)
ALBUMIN/GLOB SERPL: 1.5 (ref 1–2)
ALP LIVER SERPL-CCNC: 56 U/L (ref 39–100)
ALT SERPL-CCNC: <7 U/L (ref 10–49)
ANION GAP SERPL CALC-SCNC: 10 MMOL/L (ref 0–18)
APTT PPP: 28.3 SECONDS (ref 23–36)
APTT PPP: 99.1 SECONDS (ref 23–36)
AST SERPL-CCNC: 15 U/L (ref ?–34)
ATRIAL RATE: 109 BPM
BASOPHILS # BLD AUTO: 0.03 X10(3) UL (ref 0–0.2)
BASOPHILS NFR BLD AUTO: 0.2 %
BILIRUB SERPL-MCNC: 0.4 MG/DL (ref 0.3–1.2)
BILIRUB UR QL STRIP.AUTO: NEGATIVE
BUN BLD-MCNC: 7 MG/DL (ref 9–23)
CALCIUM BLD-MCNC: 9.7 MG/DL (ref 8.7–10.6)
CHLORIDE SERPL-SCNC: 104 MMOL/L (ref 98–112)
CLARITY UR REFRACT.AUTO: CLEAR
CO2 SERPL-SCNC: 25 MMOL/L (ref 21–32)
COLOR UR AUTO: COLORLESS
CREAT BLD-MCNC: 0.83 MG/DL (ref 0.55–1.02)
D DIMER PPP FEU-MCNC: 9.8 UG/ML FEU (ref ?–0.5)
EGFRCR SERPLBLD CKD-EPI 2021: 88 ML/MIN/1.73M2 (ref 60–?)
EOSINOPHIL # BLD AUTO: 0.06 X10(3) UL (ref 0–0.7)
EOSINOPHIL NFR BLD AUTO: 0.5 %
ERYTHROCYTE [DISTWIDTH] IN BLOOD BY AUTOMATED COUNT: 12.8 %
FLUAV + FLUBV RNA SPEC NAA+PROBE: NEGATIVE
FLUAV + FLUBV RNA SPEC NAA+PROBE: NEGATIVE
GLOBULIN PLAS-MCNC: 3.2 G/DL (ref 2–3.5)
GLUCOSE BLD-MCNC: 104 MG/DL (ref 70–99)
GLUCOSE UR STRIP.AUTO-MCNC: NORMAL MG/DL
HCG SERPL QL: NEGATIVE
HCT VFR BLD AUTO: 33.5 % (ref 35–48)
HGB BLD-MCNC: 12.5 G/DL (ref 12–16)
IMM GRANULOCYTES # BLD AUTO: 0.04 X10(3) UL (ref 0–1)
IMM GRANULOCYTES NFR BLD: 0.3 %
KETONES UR STRIP.AUTO-MCNC: NEGATIVE MG/DL
LEUKOCYTE ESTERASE UR QL STRIP.AUTO: 25
LIPASE SERPL-CCNC: 42 U/L (ref 12–53)
LYMPHOCYTES # BLD AUTO: 2.4 X10(3) UL (ref 1–4)
LYMPHOCYTES NFR BLD AUTO: 19.8 %
MCH RBC QN AUTO: 30 PG (ref 26–34)
MCHC RBC AUTO-ENTMCNC: 37.3 G/DL (ref 31–37)
MCV RBC AUTO: 80.5 FL (ref 80–100)
MONOCYTES # BLD AUTO: 0.84 X10(3) UL (ref 0.1–1)
MONOCYTES NFR BLD AUTO: 6.9 %
NEUTROPHILS # BLD AUTO: 8.77 X10 (3) UL (ref 1.5–7.7)
NEUTROPHILS # BLD AUTO: 8.77 X10(3) UL (ref 1.5–7.7)
NEUTROPHILS NFR BLD AUTO: 72.3 %
NITRITE UR QL STRIP.AUTO: NEGATIVE
NT-PROBNP SERPL-MCNC: 41 PG/ML (ref ?–125)
OSMOLALITY SERPL CALC.SUM OF ELEC: 286 MOSM/KG (ref 275–295)
P AXIS: 58 DEGREES
P-R INTERVAL: 134 MS
PH UR STRIP.AUTO: 7 (ref 5–8)
PLATELET # BLD AUTO: 270 10(3)UL (ref 150–450)
POTASSIUM SERPL-SCNC: 4.1 MMOL/L (ref 3.5–5.1)
PROT SERPL-MCNC: 7.9 G/DL (ref 5.7–8.2)
PROT UR STRIP.AUTO-MCNC: NEGATIVE MG/DL
Q-T INTERVAL: 318 MS
QRS DURATION: 66 MS
QTC CALCULATION (BEZET): 428 MS
R AXIS: 62 DEGREES
RBC # BLD AUTO: 4.16 X10(6)UL (ref 3.8–5.3)
RBC UR QL AUTO: NEGATIVE
RSV RNA SPEC NAA+PROBE: NEGATIVE
SARS-COV-2 RNA RESP QL NAA+PROBE: NOT DETECTED
SODIUM SERPL-SCNC: 139 MMOL/L (ref 136–145)
SP GR UR STRIP.AUTO: 1.01 (ref 1–1.03)
T AXIS: 19 DEGREES
TROPONIN I SERPL HS-MCNC: <3 NG/L (ref ?–34)
UROBILINOGEN UR STRIP.AUTO-MCNC: NORMAL MG/DL
VENTRICULAR RATE: 109 BPM
WBC # BLD AUTO: 12.1 X10(3) UL (ref 4–11)

## 2025-08-28 PROCEDURE — 93306 TTE W/DOPPLER COMPLETE: CPT | Performed by: EMERGENCY MEDICINE

## 2025-08-28 PROCEDURE — 99223 1ST HOSP IP/OBS HIGH 75: CPT | Performed by: HOSPITALIST

## 2025-08-28 PROCEDURE — 74177 CT ABD & PELVIS W/CONTRAST: CPT | Performed by: EMERGENCY MEDICINE

## 2025-08-28 PROCEDURE — 71275 CT ANGIOGRAPHY CHEST: CPT | Performed by: EMERGENCY MEDICINE

## 2025-08-28 PROCEDURE — 71045 X-RAY EXAM CHEST 1 VIEW: CPT | Performed by: EMERGENCY MEDICINE

## 2025-08-28 PROCEDURE — 93970 EXTREMITY STUDY: CPT | Performed by: HOSPITALIST

## 2025-08-28 RX ORDER — HEPARIN SODIUM AND DEXTROSE 10000; 5 [USP'U]/100ML; G/100ML
INJECTION INTRAVENOUS CONTINUOUS
Status: DISCONTINUED | OUTPATIENT
Start: 2025-08-28 | End: 2025-08-29

## 2025-08-28 RX ORDER — DIPHENHYDRAMINE HYDROCHLORIDE 50 MG/ML
25 INJECTION, SOLUTION INTRAMUSCULAR; INTRAVENOUS ONCE
Status: COMPLETED | OUTPATIENT
Start: 2025-08-28 | End: 2025-08-28

## 2025-08-28 RX ORDER — ALBUTEROL SULFATE 90 UG/1
2 INHALANT RESPIRATORY (INHALATION) EVERY 4 HOURS PRN
Status: DISCONTINUED | OUTPATIENT
Start: 2025-08-28 | End: 2025-08-29

## 2025-08-28 RX ORDER — METOPROLOL SUCCINATE 25 MG/1
25 TABLET, EXTENDED RELEASE ORAL EVERY MORNING
COMMUNITY
Start: 2025-08-21

## 2025-08-28 RX ORDER — MORPHINE SULFATE 2 MG/ML
2 INJECTION, SOLUTION INTRAMUSCULAR; INTRAVENOUS EVERY 2 HOUR PRN
Status: DISCONTINUED | OUTPATIENT
Start: 2025-08-28 | End: 2025-08-29

## 2025-08-28 RX ORDER — METOPROLOL SUCCINATE 25 MG/1
25 TABLET, EXTENDED RELEASE ORAL EVERY MORNING
Status: DISCONTINUED | OUTPATIENT
Start: 2025-08-28 | End: 2025-08-29

## 2025-08-28 RX ORDER — METHYLPREDNISOLONE SODIUM SUCCINATE 125 MG/2ML
125 INJECTION INTRAMUSCULAR; INTRAVENOUS ONCE
Status: COMPLETED | OUTPATIENT
Start: 2025-08-28 | End: 2025-08-28

## 2025-08-28 RX ORDER — PROCHLORPERAZINE EDISYLATE 5 MG/ML
5 INJECTION INTRAMUSCULAR; INTRAVENOUS EVERY 8 HOURS PRN
Status: DISCONTINUED | OUTPATIENT
Start: 2025-08-28 | End: 2025-08-29

## 2025-08-28 RX ORDER — LINACLOTIDE 72 UG/1
1 CAPSULE, GELATIN COATED ORAL DAILY
COMMUNITY

## 2025-08-28 RX ORDER — ASPIRIN 81 MG/1
324 TABLET, CHEWABLE ORAL ONCE
Status: DISCONTINUED | OUTPATIENT
Start: 2025-08-28 | End: 2025-08-28

## 2025-08-28 RX ORDER — FLUTICASONE PROPIONATE AND SALMETEROL 250; 50 UG/1; UG/1
1 POWDER RESPIRATORY (INHALATION) 2 TIMES DAILY
Status: DISCONTINUED | OUTPATIENT
Start: 2025-08-28 | End: 2025-08-29

## 2025-08-28 RX ORDER — HEPARIN SODIUM 1000 [USP'U]/ML
80 INJECTION, SOLUTION INTRAVENOUS; SUBCUTANEOUS ONCE
Status: COMPLETED | OUTPATIENT
Start: 2025-08-28 | End: 2025-08-28

## 2025-08-28 RX ORDER — MONTELUKAST SODIUM 10 MG/1
10 TABLET ORAL NIGHTLY
Status: DISCONTINUED | OUTPATIENT
Start: 2025-08-28 | End: 2025-08-29

## 2025-08-28 RX ORDER — MORPHINE SULFATE 2 MG/ML
1 INJECTION, SOLUTION INTRAMUSCULAR; INTRAVENOUS EVERY 2 HOUR PRN
Status: DISCONTINUED | OUTPATIENT
Start: 2025-08-28 | End: 2025-08-29

## 2025-08-28 RX ORDER — HEPARIN SODIUM AND DEXTROSE 10000; 5 [USP'U]/100ML; G/100ML
18 INJECTION INTRAVENOUS ONCE
Status: COMPLETED | OUTPATIENT
Start: 2025-08-28 | End: 2025-08-28

## 2025-08-28 RX ORDER — MORPHINE SULFATE 2 MG/ML
0.5 INJECTION, SOLUTION INTRAMUSCULAR; INTRAVENOUS EVERY 2 HOUR PRN
Status: DISCONTINUED | OUTPATIENT
Start: 2025-08-28 | End: 2025-08-29

## 2025-08-28 RX ORDER — ONDANSETRON 2 MG/ML
4 INJECTION INTRAMUSCULAR; INTRAVENOUS ONCE
Status: COMPLETED | OUTPATIENT
Start: 2025-08-28 | End: 2025-08-28

## 2025-08-28 RX ORDER — ONDANSETRON 2 MG/ML
4 INJECTION INTRAMUSCULAR; INTRAVENOUS EVERY 6 HOURS PRN
Status: DISCONTINUED | OUTPATIENT
Start: 2025-08-28 | End: 2025-08-29

## 2025-08-28 RX ORDER — MORPHINE SULFATE 4 MG/ML
4 INJECTION, SOLUTION INTRAMUSCULAR; INTRAVENOUS ONCE
Status: COMPLETED | OUTPATIENT
Start: 2025-08-28 | End: 2025-08-28

## 2025-08-28 RX ORDER — ECHINACEA PURPUREA EXTRACT 125 MG
1 TABLET ORAL
Status: DISCONTINUED | OUTPATIENT
Start: 2025-08-28 | End: 2025-08-29

## 2025-08-29 VITALS
OXYGEN SATURATION: 94 % | TEMPERATURE: 99 F | SYSTOLIC BLOOD PRESSURE: 107 MMHG | DIASTOLIC BLOOD PRESSURE: 52 MMHG | BODY MASS INDEX: 29.99 KG/M2 | RESPIRATION RATE: 20 BRPM | WEIGHT: 175.69 LBS | HEART RATE: 68 BPM | HEIGHT: 64 IN

## 2025-08-29 LAB
APTT PPP: 75.6 SECONDS (ref 23–36)
APTT PPP: 87.6 SECONDS (ref 23–36)
BASOPHILS # BLD AUTO: 0.01 X10(3) UL (ref 0–0.2)
BASOPHILS NFR BLD AUTO: 0.1 %
EOSINOPHIL # BLD AUTO: 0 X10(3) UL (ref 0–0.7)
EOSINOPHIL NFR BLD AUTO: 0 %
ERYTHROCYTE [DISTWIDTH] IN BLOOD BY AUTOMATED COUNT: 12.9 %
HCT VFR BLD AUTO: 30.4 % (ref 35–48)
HGB BLD-MCNC: 11.1 G/DL (ref 12–16)
IMM GRANULOCYTES # BLD AUTO: 0.05 X10(3) UL (ref 0–1)
IMM GRANULOCYTES NFR BLD: 0.3 %
INR BLD: 1.12 (ref 0.85–1.16)
LA 3 SCREEN W REFLEX-IMP: NEGATIVE
LYMPHOCYTES # BLD AUTO: 1.37 X10(3) UL (ref 1–4)
LYMPHOCYTES NFR BLD AUTO: 9.4 %
MCH RBC QN AUTO: 30 PG (ref 26–34)
MCHC RBC AUTO-ENTMCNC: 36.5 G/DL (ref 31–37)
MCV RBC AUTO: 82.2 FL (ref 80–100)
MONOCYTES # BLD AUTO: 0.8 X10(3) UL (ref 0.1–1)
MONOCYTES NFR BLD AUTO: 5.5 %
NEUTROPHILS # BLD AUTO: 12.28 X10 (3) UL (ref 1.5–7.7)
NEUTROPHILS # BLD AUTO: 12.28 X10(3) UL (ref 1.5–7.7)
NEUTROPHILS NFR BLD AUTO: 84.7 %
PLATELET # BLD AUTO: 242 10(3)UL (ref 150–450)
PROTHROMBIN TIME: 14.5 SECONDS (ref 11.6–14.8)
RBC # BLD AUTO: 3.7 X10(6)UL (ref 3.8–5.3)
SCREEN DRVVT: 1.27 (ref 0–1.29)
SCREEN DRVVT: NEGATIVE
STACLOT LA DELTA: 6.6 SECONDS (ref ?–8)
WBC # BLD AUTO: 14.5 X10(3) UL (ref 4–11)

## 2025-08-29 PROCEDURE — 99239 HOSP IP/OBS DSCHRG MGMT >30: CPT | Performed by: HOSPITALIST

## 2025-08-29 PROCEDURE — 99223 1ST HOSP IP/OBS HIGH 75: CPT | Performed by: INTERNAL MEDICINE

## 2025-08-29 RX ORDER — HYDROCODONE BITARTRATE AND ACETAMINOPHEN 5; 325 MG/1; MG/1
1-2 TABLET ORAL EVERY 6 HOURS PRN
Qty: 24 TABLET | Refills: 0 | Status: SHIPPED | OUTPATIENT
Start: 2025-08-29 | End: 2025-08-29

## 2025-08-29 RX ORDER — HYDROCODONE BITARTRATE AND ACETAMINOPHEN 5; 325 MG/1; MG/1
1-2 TABLET ORAL EVERY 6 HOURS PRN
Qty: 6 TABLET | Refills: 0 | Status: SHIPPED | OUTPATIENT
Start: 2025-08-29

## 2025-08-31 ENCOUNTER — HOSPITAL ENCOUNTER (EMERGENCY)
Facility: HOSPITAL | Age: 45
Discharge: HOME | End: 2025-08-31
Attending: EMERGENCY MEDICINE

## 2025-08-31 ENCOUNTER — APPOINTMENT (OUTPATIENT)
Dept: CT IMAGING | Facility: HOSPITAL | Age: 45
End: 2025-08-31
Attending: EMERGENCY MEDICINE

## 2025-08-31 ENCOUNTER — APPOINTMENT (OUTPATIENT)
Dept: GENERAL RADIOLOGY | Facility: HOSPITAL | Age: 45
End: 2025-08-31

## 2025-08-31 VITALS
BODY MASS INDEX: 29.37 KG/M2 | SYSTOLIC BLOOD PRESSURE: 112 MMHG | WEIGHT: 172 LBS | HEIGHT: 64 IN | HEART RATE: 70 BPM | DIASTOLIC BLOOD PRESSURE: 73 MMHG | OXYGEN SATURATION: 98 % | TEMPERATURE: 99 F | RESPIRATION RATE: 18 BRPM

## 2025-08-31 DIAGNOSIS — I26.99 PULMONARY INFARCT (HCC): ICD-10-CM

## 2025-08-31 DIAGNOSIS — Z86.711 HISTORY OF PULMONARY EMBOLISM: Primary | ICD-10-CM

## 2025-08-31 LAB
ALBUMIN SERPL-MCNC: 4.4 G/DL (ref 3.2–4.8)
ALBUMIN/GLOB SERPL: 1.5 (ref 1–2)
ALP LIVER SERPL-CCNC: 53 U/L (ref 39–100)
ALT SERPL-CCNC: <7 U/L (ref 10–49)
ANION GAP SERPL CALC-SCNC: 14 MMOL/L (ref 0–18)
AST SERPL-CCNC: 11 U/L (ref ?–34)
ATRIAL RATE: 90 BPM
BASOPHILS # BLD AUTO: 0.03 X10(3) UL (ref 0–0.2)
BASOPHILS NFR BLD AUTO: 0.2 %
BILIRUB SERPL-MCNC: 0.3 MG/DL (ref 0.3–1.2)
BUN BLD-MCNC: 13 MG/DL (ref 9–23)
CALCIUM BLD-MCNC: 9.4 MG/DL (ref 8.7–10.6)
CHLORIDE SERPL-SCNC: 102 MMOL/L (ref 98–112)
CO2 SERPL-SCNC: 21 MMOL/L (ref 21–32)
CREAT BLD-MCNC: 0.88 MG/DL (ref 0.55–1.02)
EGFRCR SERPLBLD CKD-EPI 2021: 82 ML/MIN/1.73M2 (ref 60–?)
EOSINOPHIL # BLD AUTO: 0.06 X10(3) UL (ref 0–0.7)
EOSINOPHIL NFR BLD AUTO: 0.4 %
ERYTHROCYTE [DISTWIDTH] IN BLOOD BY AUTOMATED COUNT: 12.8 %
GLOBULIN PLAS-MCNC: 2.9 G/DL (ref 2–3.5)
GLUCOSE BLD-MCNC: 101 MG/DL (ref 70–99)
HCT VFR BLD AUTO: 31.8 % (ref 35–48)
HGB BLD-MCNC: 11.8 G/DL (ref 12–16)
IMM GRANULOCYTES # BLD AUTO: 0.04 X10(3) UL (ref 0–1)
IMM GRANULOCYTES NFR BLD: 0.3 %
LYMPHOCYTES # BLD AUTO: 4.11 X10(3) UL (ref 1–4)
LYMPHOCYTES NFR BLD AUTO: 30.4 %
MCH RBC QN AUTO: 29.8 PG (ref 26–34)
MCHC RBC AUTO-ENTMCNC: 37.1 G/DL (ref 31–37)
MCV RBC AUTO: 80.3 FL (ref 80–100)
MONOCYTES # BLD AUTO: 0.89 X10(3) UL (ref 0.1–1)
MONOCYTES NFR BLD AUTO: 6.6 %
NEUTROPHILS # BLD AUTO: 8.39 X10 (3) UL (ref 1.5–7.7)
NEUTROPHILS # BLD AUTO: 8.39 X10(3) UL (ref 1.5–7.7)
NEUTROPHILS NFR BLD AUTO: 62.1 %
NT-PROBNP SERPL-MCNC: 115 PG/ML (ref ?–125)
OSMOLALITY SERPL CALC.SUM OF ELEC: 284 MOSM/KG (ref 275–295)
P AXIS: 47 DEGREES
P-R INTERVAL: 152 MS
PLATELET # BLD AUTO: 289 10(3)UL (ref 150–450)
POTASSIUM SERPL-SCNC: 4.1 MMOL/L (ref 3.5–5.1)
PROT SERPL-MCNC: 7.3 G/DL (ref 5.7–8.2)
Q-T INTERVAL: 350 MS
QRS DURATION: 68 MS
QTC CALCULATION (BEZET): 428 MS
R AXIS: 37 DEGREES
RBC # BLD AUTO: 3.96 X10(6)UL (ref 3.8–5.3)
SODIUM SERPL-SCNC: 137 MMOL/L (ref 136–145)
T AXIS: 19 DEGREES
TROPONIN I SERPL HS-MCNC: <3 NG/L (ref ?–34)
VENTRICULAR RATE: 90 BPM
WBC # BLD AUTO: 13.5 X10(3) UL (ref 4–11)

## 2025-08-31 PROCEDURE — 85025 COMPLETE CBC W/AUTO DIFF WBC: CPT | Performed by: EMERGENCY MEDICINE

## 2025-08-31 PROCEDURE — 83880 ASSAY OF NATRIURETIC PEPTIDE: CPT | Performed by: EMERGENCY MEDICINE

## 2025-08-31 PROCEDURE — 71045 X-RAY EXAM CHEST 1 VIEW: CPT

## 2025-08-31 PROCEDURE — 71275 CT ANGIOGRAPHY CHEST: CPT | Performed by: EMERGENCY MEDICINE

## 2025-08-31 PROCEDURE — 80053 COMPREHEN METABOLIC PANEL: CPT

## 2025-08-31 PROCEDURE — 80053 COMPREHEN METABOLIC PANEL: CPT | Performed by: EMERGENCY MEDICINE

## 2025-08-31 PROCEDURE — 85025 COMPLETE CBC W/AUTO DIFF WBC: CPT

## 2025-08-31 PROCEDURE — 84484 ASSAY OF TROPONIN QUANT: CPT | Performed by: EMERGENCY MEDICINE

## 2025-08-31 RX ORDER — METHYLPREDNISOLONE SODIUM SUCCINATE 125 MG/2ML
125 INJECTION INTRAMUSCULAR; INTRAVENOUS ONCE
Status: COMPLETED | OUTPATIENT
Start: 2025-08-31 | End: 2025-08-31

## 2025-08-31 RX ORDER — DIPHENHYDRAMINE HYDROCHLORIDE 50 MG/ML
50 INJECTION, SOLUTION INTRAMUSCULAR; INTRAVENOUS ONCE
Status: COMPLETED | OUTPATIENT
Start: 2025-08-31 | End: 2025-08-31

## 2025-08-31 RX ORDER — ONDANSETRON 2 MG/ML
4 INJECTION INTRAMUSCULAR; INTRAVENOUS ONCE
Status: COMPLETED | OUTPATIENT
Start: 2025-08-31 | End: 2025-08-31

## 2025-08-31 RX ORDER — MORPHINE SULFATE 4 MG/ML
4 INJECTION, SOLUTION INTRAMUSCULAR; INTRAVENOUS ONCE
Status: COMPLETED | OUTPATIENT
Start: 2025-08-31 | End: 2025-08-31

## 2025-08-31 RX ORDER — OXYCODONE AND ACETAMINOPHEN 5; 325 MG/1; MG/1
1-2 TABLET ORAL EVERY 6 HOURS PRN
Qty: 10 TABLET | Refills: 0 | Status: SHIPPED | OUTPATIENT
Start: 2025-08-31 | End: 2025-09-05

## 2025-08-31 RX ORDER — PANTOPRAZOLE SODIUM 20 MG/1
20 TABLET, DELAYED RELEASE ORAL DAILY
Qty: 30 TABLET | Refills: 0 | Status: SHIPPED | OUTPATIENT
Start: 2025-08-31 | End: 2025-09-30

## (undated) NOTE — LETTER
Date & Time: 6/12/2024, 8:44 PM  Patient: Meg Helm  Encounter Provider(s):    Daquan Marin MD       To Whom It May Concern:    Meg Helm was seen and treated in our department on 6/12/2024. She should not return to work until 6/19/2024 .    If you have any questions or concerns, please do not hesitate to call.        _____________________________  Physician/APC Signature